# Patient Record
Sex: MALE | Race: WHITE | Employment: FULL TIME | URBAN - METROPOLITAN AREA
[De-identification: names, ages, dates, MRNs, and addresses within clinical notes are randomized per-mention and may not be internally consistent; named-entity substitution may affect disease eponyms.]

---

## 2017-03-08 DIAGNOSIS — I15.0 RENOVASCULAR HYPERTENSION: ICD-10-CM

## 2017-03-08 RX ORDER — AMLODIPINE BESYLATE 5 MG/1
TABLET ORAL
Qty: 30 TAB | Refills: 0 | Status: SHIPPED | OUTPATIENT
Start: 2017-03-08 | End: 2017-04-06 | Stop reason: SDUPTHER

## 2017-04-06 DIAGNOSIS — I15.0 RENOVASCULAR HYPERTENSION: ICD-10-CM

## 2017-04-07 RX ORDER — AMLODIPINE BESYLATE 5 MG/1
TABLET ORAL
Qty: 30 TAB | Refills: 0 | Status: SHIPPED | OUTPATIENT
Start: 2017-04-07 | End: 2017-05-18 | Stop reason: SDUPTHER

## 2017-05-18 ENCOUNTER — OFFICE VISIT (OUTPATIENT)
Dept: FAMILY MEDICINE CLINIC | Age: 52
End: 2017-05-18

## 2017-05-18 VITALS
WEIGHT: 135.2 LBS | HEIGHT: 63 IN | TEMPERATURE: 98 F | HEART RATE: 68 BPM | RESPIRATION RATE: 18 BRPM | DIASTOLIC BLOOD PRESSURE: 83 MMHG | SYSTOLIC BLOOD PRESSURE: 125 MMHG | BODY MASS INDEX: 23.96 KG/M2 | OXYGEN SATURATION: 97 %

## 2017-05-18 DIAGNOSIS — Z12.11 SCREEN FOR COLON CANCER: ICD-10-CM

## 2017-05-18 DIAGNOSIS — I15.0 RENOVASCULAR HYPERTENSION: Primary | ICD-10-CM

## 2017-05-18 RX ORDER — GUAIFENESIN 100 MG/5ML
81 LIQUID (ML) ORAL DAILY
COMMUNITY

## 2017-05-18 RX ORDER — AMLODIPINE BESYLATE 5 MG/1
TABLET ORAL
Qty: 90 TAB | Refills: 3 | Status: SHIPPED | OUTPATIENT
Start: 2017-05-18 | End: 2018-10-31 | Stop reason: SDUPTHER

## 2017-05-18 NOTE — MR AVS SNAPSHOT
Visit Information Date & Time Provider Department Dept. Phone Encounter #  
 5/18/2017  4:00 PM Mohan Abad MD [facility] [phone] Follow-up Instructions Return in about 6 months (around 11/18/2017), or if symptoms worsen or fail to improve, for htn. Your Appointments 7/12/2017  1:45 PM  
PROCEDURE with Mitzy Finley MD  
[facility] Appt Note: 6mo Cysto/Cyto  
 [address] 
[address] [place] [address]  
[address] [facility] [address] Upcoming Health Maintenance Date Due FOBT Q 1 YEAR AGE 50-75 3/10/2017 INFLUENZA AGE 9 TO ADULT 8/1/2017 Pneumococcal 19-64 Highest Risk (2 of 3 - PCV13) 12/6/2017 DTaP/Tdap/Td series (2 - Td) 5/18/2027 Allergies as of 5/18/2017  Review Complete On: 5/18/2017 By: Domenic Rodriguez LPN Severity Noted Reaction Type Reactions Codeine  06/24/2014    Itching Current Immunizations  Never Reviewed No immunizations on file. Not reviewed this visit You Were Diagnosed With   
  
 Codes Comments Renovascular hypertension    -  Primary ICD-10-CM: I15.0 ICD-9-CM: 405.91 Vitals BP Pulse Temp Resp Height(growth percentile) Weight(growth percentile) 125/83 (BP 1 Location: Left arm, BP Patient Position: Sitting) 68 98 °F (36.7 °C) (Oral) 18 5' 3\" (1.6 m) 135 lb 3.2 oz (61.3 kg) SpO2 BMI Smoking Status 97% 23.95 kg/m2 Current Every Day Smoker Vitals History BMI and BSA Data Body Mass Index Body Surface Area  
 23.95 kg/m 2 1.65 m 2 Preferred Pharmacy Pharmacy Name Phone [facility] [address] [place] [address] AT [facility] [phone] Your Updated Medication List  
  
   
This list is accurate as of: 5/18/17  4:13 PM.  Always use your most recent med list.  
  
  
  
  
 amLODIPine 5 mg tablet Commonly known as:  Rekha Hahn TAKE 1 TABLET BY MOUTH DAILY  
  
 aspirin 81 mg chewable tablet Take 81 mg by mouth daily. Prescriptions Sent to Pharmacy Refills  
 amLODIPine (NORVASC) 5 mg tablet 3 Sig: TAKE 1 TABLET BY MOUTH DAILY Class: Normal  
 Pharmacy: [facility] [address] [facility] [address]  #: [phone] Follow-up Instructions Return in about 6 months (around 11/18/2017), or if symptoms worsen or fail to improve, for htn. To-Do List   
 05/18/2017 Lab:  CBC WITH AUTOMATED DIFF   
  
 05/18/2017 Lab:  LIPID PANEL   
  
 05/18/2017 Lab:  METABOLIC PANEL, COMPREHENSIVE Patient Instructions DASH Diet: Care Instructions Your Care Instructions The DASH diet is an eating plan that can help lower your blood pressure. DASH stands for Dietary Approaches to Stop Hypertension. Hypertension is high blood pressure. The DASH diet focuses on eating foods that are high in calcium, potassium, and magnesium. These nutrients can lower blood pressure. The foods that are highest in these nutrients are fruits, vegetables, low-fat dairy products, nuts, seeds, and legumes. But taking calcium, potassium, and magnesium supplements instead of eating foods that are high in those nutrients does not have the same effect. The DASH diet also includes whole grains, fish, and poultry. The DASH diet is one of several lifestyle changes your doctor may recommend to lower your high blood pressure. Your doctor may also want you to decrease the amount of sodium in your diet. Lowering sodium while following the DASH diet can lower blood pressure even further than just the DASH diet alone. Follow-up care is a key part of your treatment and safety.  Be sure to make and go to all appointments, and call your doctor if you are having problems. It's also a good idea to know your test results and keep a list of the medicines you take. How can you care for yourself at home? Following the DASH diet · Eat 4 to 5 servings of fruit each day. A serving is 1 medium-sized piece of fruit, ½ cup chopped or canned fruit, 1/4 cup dried fruit, or 4 ounces (½ cup) of fruit juice. Choose fruit more often than fruit juice. · Eat 4 to 5 servings of vegetables each day. A serving is 1 cup of lettuce or raw leafy vegetables, ½ cup of chopped or cooked vegetables, or 4 ounces (½ cup) of vegetable juice. Choose vegetables more often than vegetable juice. · Get 2 to 3 servings of low-fat and fat-free dairy each day. A serving is 8 ounces of milk, 1 cup of yogurt, or 1 ½ ounces of cheese. · Eat 6 to 8 servings of grains each day. A serving is 1 slice of bread, 1 ounce of dry cereal, or ½ cup of cooked rice, pasta, or cooked cereal. Try to choose whole-grain products as much as possible. · Limit lean meat, poultry, and fish to 2 servings each day. A serving is 3 ounces, about the size of a deck of cards. · Eat 4 to 5 servings of nuts, seeds, and legumes (cooked dried beans, lentils, and split peas) each week. A serving is 1/3 cup of nuts, 2 tablespoons of seeds, or ½ cup of cooked beans or peas. · Limit fats and oils to 2 to 3 servings each day. A serving is 1 teaspoon of vegetable oil or 2 tablespoons of salad dressing. · Limit sweets and added sugars to 5 servings or less a week. A serving is 1 tablespoon jelly or jam, ½ cup sorbet, or 1 cup of lemonade. · Eat less than 2,300 milligrams (mg) of sodium a day. If you limit your sodium to 1,500 mg a day, you can lower your blood pressure even more. Tips for success · Start small. Do not try to make dramatic changes to your diet all at once. You might feel that you are missing out on your favorite foods and then be more likely to not follow the plan.  Make small changes, and stick with them. Once those changes become habit, add a few more changes. · Try some of the following: ¨ Make it a goal to eat a fruit or vegetable at every meal and at snacks. This will make it easy to get the recommended amount of fruits and vegetables each day. ¨ Try yogurt topped with fruit and nuts for a snack or healthy dessert. ¨ Add lettuce, tomato, cucumber, and onion to sandwiches. ¨ Combine a ready-made pizza crust with low-fat mozzarella cheese and lots of vegetable toppings. Try using tomatoes, squash, spinach, broccoli, carrots, cauliflower, and onions. ¨ Have a variety of cut-up vegetables with a low-fat dip as an appetizer instead of chips and dip. ¨ Sprinkle sunflower seeds or chopped almonds over salads. Or try adding chopped walnuts or almonds to cooked vegetables. ¨ Try some vegetarian meals using beans and peas. Add garbanzo or kidney beans to salads. Make burritos and tacos with mashed salinas beans or black beans. Where can you learn more? Go to http://staciSmartStudy.commar.info/. Enter T381 in the search box to learn more about \"DASH Diet: Care Instructions. \" Current as of: March 23, 2016 Content Version: 11.2 © 8707-8593 [employer]ocratic. Care instructions adapted under license by [employer] (which disclaims liability or warranty for this information). If you have questions about a medical condition or this instruction, always ask your healthcare professional. [place] [address] any warranty or liability for your use of this information. High Blood Pressure: Care Instructions Your Care Instructions If your blood pressure is usually above 140/90, you have high blood pressure, or hypertension. That means the top number is 140 or higher or the bottom number is 90 or higher, or both. Despite what a lot of people think, high blood pressure usually doesn't cause headaches or make you feel dizzy or lightheaded.  It usually has no symptoms. But it does increase your risk for heart attack, stroke, and kidney or eye damage. The higher your blood pressure, the more your risk increases. Your doctor will give you a goal for your blood pressure. Your goal will be based on your health and your age. An example of a goal is to keep your blood pressure below 140/90. Lifestyle changes, such as eating healthy and being active, are always important to help lower blood pressure. You might also take medicine to reach your blood pressure goal. 
Follow-up care is a key part of your treatment and safety. Be sure to make and go to all appointments, and call your doctor if you are having problems. It's also a good idea to know your test results and keep a list of the medicines you take. How can you care for yourself at home? Medical treatment · If you stop taking your medicine, your blood pressure will go back up. You may take one or more types of medicine to lower your blood pressure. Be safe with medicines. Take your medicine exactly as prescribed. Call your doctor if you think you are having a problem with your medicine. · Talk to your doctor before you start taking aspirin every day. Aspirin can help certain people lower their risk of a heart attack or stroke. But taking aspirin isn't right for everyone, because it can cause serious bleeding. · See your doctor regularly. You may need to see the doctor more often at first or until your blood pressure comes down. · If you are taking blood pressure medicine, talk to your doctor before you take decongestants or anti-inflammatory medicine, such as ibuprofen. Some of these medicines can raise blood pressure. · Learn how to check your blood pressure at home. Lifestyle changes · Stay at a healthy weight. This is especially important if you put on weight around the waist. Losing even 10 pounds can help you lower your blood pressure. · If your doctor recommends it, get more exercise.  Walking is a good choice. Bit by bit, increase the amount you walk every day. Try for at least 30 minutes on most days of the week. You also may want to swim, bike, or do other activities. · Avoid or limit alcohol. Talk to your doctor about whether you can drink any alcohol. · Try to limit how much sodium you eat to less than 2,300 milligrams (mg) a day. Your doctor may ask you to try to eat less than 1,500 mg a day. · Eat plenty of fruits (such as bananas and oranges), vegetables, legumes, whole grains, and low-fat dairy products. · Lower the amount of saturated fat in your diet. Saturated fat is found in animal products such as milk, cheese, and meat. Limiting these foods may help you lose weight and also lower your risk for heart disease. · Do not smoke. Smoking increases your risk for heart attack and stroke. If you need help quitting, talk to your doctor about stop-smoking programs and medicines. These can increase your chances of quitting for good. When should you call for help? Call 911 anytime you think you may need emergency care. This may mean having symptoms that suggest that your blood pressure is causing a serious heart or blood vessel problem. Your blood pressure may be over 180/110. For example, call 911 if: 
· You have symptoms of a heart attack. These may include: ¨ Chest pain or pressure, or a strange feeling in the chest. 
¨ Sweating. ¨ Shortness of breath. ¨ Nausea or vomiting. ¨ Pain, pressure, or a strange feeling in the back, neck, jaw, or upper belly or in one or both shoulders or arms. ¨ Lightheadedness or sudden weakness. ¨ A fast or irregular heartbeat. · You have symptoms of a stroke. These may include: 
¨ Sudden numbness, tingling, weakness, or loss of movement in your face, arm, or leg, especially on only one side of your body. ¨ Sudden vision changes. ¨ Sudden trouble speaking. ¨ Sudden confusion or trouble understanding simple statements. ¨ Sudden problems with walking or balance. ¨ A sudden, severe headache that is different from past headaches. · You have severe back or belly pain. Do not wait until your blood pressure comes down on its own. Get help right away. Call your doctor now or seek immediate care if: 
· Your blood pressure is much higher than normal (such as 180/110 or higher), but you don't have symptoms. · You think high blood pressure is causing symptoms, such as: ¨ Severe headache. ¨ Blurry vision. Watch closely for changes in your health, and be sure to contact your doctor if: 
· Your blood pressure measures 140/90 or higher at least 2 times. That means the top number is 140 or higher or the bottom number is 90 or higher, or both. · You think you may be having side effects from your blood pressure medicine. · Your blood pressure is usually normal, but it goes above normal at least 2 times. Where can you learn more? Go to http://staci-mar.info/. Enter G450 in the search box to learn more about \"High Blood Pressure: Care Instructions. \" Current as of: August 8, 2016 Content Version: 11.2 © 3727-3249 [employer]. Care instructions adapted under license by [employer] (which disclaims liability or warranty for this information). If you have questions about a medical condition or this instruction, always ask your healthcare professional. [address] any warranty or liability for your use of this information. Introducing [facility]! Genie Blanco introduces Pango patient portal. Now you can access parts of your medical record, email your doctor's office, and request medication refills online. 1. In your internet browser, go to [email or web address]://[email or web address]. [employer] 2. Click on the First Time User? Click Here link in the Sign In box. You will see the New Member Sign Up page. 3. Enter your Pango Access Code exactly as it appears below.  You will not need to use this code after youve completed the sign-up process. If you do not sign up before the expiration date, you must request a new code. · Revolve. Access Code: [email or web address] Expires: 8/16/2017  4:12 PM 
 
4. Enter the last four digits of your Social Security Number (xxxx) and Date of Birth (mm/dd/yyyy) as indicated and click Submit. You will be taken to the next sign-up page. 5. Create a Revolve. ID. This will be your Revolve. login ID and cannot be changed, so think of one that is secure and easy to remember. 6. Create a Revolve. password. You can change your password at any time. 7. Enter your Password Reset Question and Answer. This can be used at a later time if you forget your password. 8. Enter your e-mail address. You will receive e-mail notification when new information is available in [address]. 9. Click Sign Up. You can now view and download portions of your medical record. 10. Click the Download Summary menu link to download a portable copy of your medical information. If you have questions, please visit the Frequently Asked Questions section of the Revolve. website. Remember, Revolve. is NOT to be used for urgent needs. For medical emergencies, dial 911. Now available from your iPhone and Android! Please provide this summary of care documentation to your next provider. Your primary care clinician is listed as Mohan Saravia. If you have any questions after today's visit, please call [phone].

## 2017-05-18 NOTE — PATIENT INSTRUCTIONS
DASH Diet: Care Instructions  Your Care Instructions  The DASH diet is an eating plan that can help lower your blood pressure. DASH stands for Dietary Approaches to Stop Hypertension. Hypertension is high blood pressure. The DASH diet focuses on eating foods that are high in calcium, potassium, and magnesium. These nutrients can lower blood pressure. The foods that are highest in these nutrients are fruits, vegetables, low-fat dairy products, nuts, seeds, and legumes. But taking calcium, potassium, and magnesium supplements instead of eating foods that are high in those nutrients does not have the same effect. The DASH diet also includes whole grains, fish, and poultry. The DASH diet is one of several lifestyle changes your doctor may recommend to lower your high blood pressure. Your doctor may also want you to decrease the amount of sodium in your diet. Lowering sodium while following the DASH diet can lower blood pressure even further than just the DASH diet alone. Follow-up care is a key part of your treatment and safety. Be sure to make and go to all appointments, and call your doctor if you are having problems. It's also a good idea to know your test results and keep a list of the medicines you take. How can you care for yourself at home? Following the DASH diet  · Eat 4 to 5 servings of fruit each day. A serving is 1 medium-sized piece of fruit, ½ cup chopped or canned fruit, 1/4 cup dried fruit, or 4 ounces (½ cup) of fruit juice. Choose fruit more often than fruit juice. · Eat 4 to 5 servings of vegetables each day. A serving is 1 cup of lettuce or raw leafy vegetables, ½ cup of chopped or cooked vegetables, or 4 ounces (½ cup) of vegetable juice. Choose vegetables more often than vegetable juice. · Get 2 to 3 servings of low-fat and fat-free dairy each day. A serving is 8 ounces of milk, 1 cup of yogurt, or 1 ½ ounces of cheese. · Eat 6 to 8 servings of grains each day.  A serving is 1 slice of bread, 1 ounce of dry cereal, or ½ cup of cooked rice, pasta, or cooked cereal. Try to choose whole-grain products as much as possible. · Limit lean meat, poultry, and fish to 2 servings each day. A serving is 3 ounces, about the size of a deck of cards. · Eat 4 to 5 servings of nuts, seeds, and legumes (cooked dried beans, lentils, and split peas) each week. A serving is 1/3 cup of nuts, 2 tablespoons of seeds, or ½ cup of cooked beans or peas. · Limit fats and oils to 2 to 3 servings each day. A serving is 1 teaspoon of vegetable oil or 2 tablespoons of salad dressing. · Limit sweets and added sugars to 5 servings or less a week. A serving is 1 tablespoon jelly or jam, ½ cup sorbet, or 1 cup of lemonade. · Eat less than 2,300 milligrams (mg) of sodium a day. If you limit your sodium to 1,500 mg a day, you can lower your blood pressure even more. Tips for success  · Start small. Do not try to make dramatic changes to your diet all at once. You might feel that you are missing out on your favorite foods and then be more likely to not follow the plan. Make small changes, and stick with them. Once those changes become habit, add a few more changes. · Try some of the following:  ¨ Make it a goal to eat a fruit or vegetable at every meal and at snacks. This will make it easy to get the recommended amount of fruits and vegetables each day. ¨ Try yogurt topped with fruit and nuts for a snack or healthy dessert. ¨ Add lettuce, tomato, cucumber, and onion to sandwiches. ¨ Combine a ready-made pizza crust with low-fat mozzarella cheese and lots of vegetable toppings. Try using tomatoes, squash, spinach, broccoli, carrots, cauliflower, and onions. ¨ Have a variety of cut-up vegetables with a low-fat dip as an appetizer instead of chips and dip. ¨ Sprinkle sunflower seeds or chopped almonds over salads. Or try adding chopped walnuts or almonds to cooked vegetables. ¨ Try some vegetarian meals using beans and peas. Add garbanzo or kidney beans to salads. Make burritos and tacos with mashed [place] beans or black beans. Where can you learn more? Go to http://staci-mar.info/. Enter E513 in the search box to learn more about \"DASH Diet: Care Instructions. \"  Current as of: March 23, 2016  Content Version: 11.2  © 4282-8861 [employer]. Care instructions adapted under license by [employer] (which disclaims liability or warranty for this information). If you have questions about a medical condition or this instruction, always ask your healthcare professional. [place] [address] any warranty or liability for your use of this information. High Blood Pressure: Care Instructions  Your Care Instructions  If your blood pressure is usually above 140/90, you have high blood pressure, or hypertension. That means the top number is 140 or higher or the bottom number is 90 or higher, or both. Despite what a lot of people think, high blood pressure usually doesn't cause headaches or make you feel dizzy or lightheaded. It usually has no symptoms. But it does increase your risk for heart attack, stroke, and kidney or eye damage. The higher your blood pressure, the more your risk increases. Your doctor will give you a goal for your blood pressure. Your goal will be based on your health and your age. An example of a goal is to keep your blood pressure below 140/90. Lifestyle changes, such as eating healthy and being active, are always important to help lower blood pressure. You might also take medicine to reach your blood pressure goal.  Follow-up care is a key part of your treatment and safety. Be sure to make and go to all appointments, and call your doctor if you are having problems. It's also a good idea to know your test results and keep a list of the medicines you take. How can you care for yourself at home?   Medical treatment  · If you stop taking your medicine, your blood pressure will go back up. You may take one or more types of medicine to lower your blood pressure. Be safe with medicines. Take your medicine exactly as prescribed. Call your doctor if you think you are having a problem with your medicine. · Talk to your doctor before you start taking aspirin every day. Aspirin can help certain people lower their risk of a heart attack or stroke. But taking aspirin isn't right for everyone, because it can cause serious bleeding. · See your doctor regularly. You may need to see the doctor more often at first or until your blood pressure comes down. · If you are taking blood pressure medicine, talk to your doctor before you take decongestants or anti-inflammatory medicine, such as ibuprofen. Some of these medicines can raise blood pressure. · Learn how to check your blood pressure at home. Lifestyle changes  · Stay at a healthy weight. This is especially important if you put on weight around the waist. Losing even 10 pounds can help you lower your blood pressure. · If your doctor recommends it, get more exercise. Walking is a good choice. Bit by bit, increase the amount you walk every day. Try for at least 30 minutes on most days of the week. You also may want to swim, bike, or do other activities. · Avoid or limit alcohol. Talk to your doctor about whether you can drink any alcohol. · Try to limit how much sodium you eat to less than 2,300 milligrams (mg) a day. Your doctor may ask you to try to eat less than 1,500 mg a day. · Eat plenty of fruits (such as bananas and oranges), vegetables, legumes, whole grains, and low-fat dairy products. · Lower the amount of saturated fat in your diet. Saturated fat is found in animal products such as milk, cheese, and meat. Limiting these foods may help you lose weight and also lower your risk for heart disease. · Do not smoke. Smoking increases your risk for heart attack and stroke.  If you need help quitting, talk to your doctor about stop-smoking programs and medicines. These can increase your chances of quitting for good. When should you call for help? Call 911 anytime you think you may need emergency care. This may mean having symptoms that suggest that your blood pressure is causing a serious heart or blood vessel problem. Your blood pressure may be over 180/110. For example, call 911 if:  · You have symptoms of a heart attack. These may include:  ¨ Chest pain or pressure, or a strange feeling in the chest.  ¨ Sweating. ¨ Shortness of breath. ¨ Nausea or vomiting. ¨ Pain, pressure, or a strange feeling in the back, neck, jaw, or upper belly or in one or both shoulders or arms. ¨ Lightheadedness or sudden weakness. ¨ A fast or irregular heartbeat. · You have symptoms of a stroke. These may include:  ¨ Sudden numbness, tingling, weakness, or loss of movement in your face, arm, or leg, especially on only one side of your body. ¨ Sudden vision changes. ¨ Sudden trouble speaking. ¨ Sudden confusion or trouble understanding simple statements. ¨ Sudden problems with walking or balance. ¨ A sudden, severe headache that is different from past headaches. · You have severe back or belly pain. Do not wait until your blood pressure comes down on its own. Get help right away. Call your doctor now or seek immediate care if:  · Your blood pressure is much higher than normal (such as 180/110 or higher), but you don't have symptoms. · You think high blood pressure is causing symptoms, such as:  ¨ Severe headache. ¨ Blurry vision. Watch closely for changes in your health, and be sure to contact your doctor if:  · Your blood pressure measures 140/90 or higher at least 2 times. That means the top number is 140 or higher or the bottom number is 90 or higher, or both. · You think you may be having side effects from your blood pressure medicine. · Your blood pressure is usually normal, but it goes above normal at least 2 times.   Where can you learn more? Go to http://staci-mar.info/. Enter X689 in the search box to learn more about \"High Blood Pressure: Care Instructions. \"  Current as of: August 8, 2016  Content Version: 11.2  © 6189-2585 [employer]. Care instructions adapted under license by [employer] (which disclaims liability or warranty for this information). If you have questions about a medical condition or this instruction, always ask your healthcare professional. [place] [address] any warranty or liability for your use of this information.
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
## 2017-05-19 LAB
A-G RATIO,AGRAT: 2 RATIO (ref 1.1–2.6)
ABSOLUTE LYMPHOCYTE COUNT, 10803: 3.2 K/UL (ref 1–4.8)
ALBUMIN SERPL-MCNC: 4.5 G/DL (ref 3.5–5)
ALP SERPL-CCNC: 45 U/L (ref 25–115)
ALT SERPL-CCNC: 13 U/L (ref 5–40)
ANION GAP SERPL CALC-SCNC: 15 MMOL/L
AST SERPL W P-5'-P-CCNC: 12 U/L (ref 10–37)
BASOPHILS # BLD: 0 K/UL (ref 0–0.2)
BASOPHILS NFR BLD: 0 % (ref 0–2)
BILIRUB SERPL-MCNC: 0.3 MG/DL (ref 0.2–1.2)
BUN SERPL-MCNC: 13 MG/DL (ref 6–22)
CALCIUM SERPL-MCNC: 9.5 MG/DL (ref 8.4–10.4)
CHLORIDE SERPL-SCNC: 108 MMOL/L (ref 98–110)
CHOLEST SERPL-MCNC: 183 MG/DL (ref 110–200)
CO2 SERPL-SCNC: 22 MMOL/L (ref 20–32)
CREAT SERPL-MCNC: 0.9 MG/DL (ref 0.5–1.2)
EOSINOPHIL # BLD: 0.6 K/UL (ref 0–0.5)
EOSINOPHIL NFR BLD: 6 % (ref 0–6)
ERYTHROCYTE [DISTWIDTH] IN BLOOD BY AUTOMATED COUNT: 14.9 % (ref 10–16)
GFRAA, 66117: >60
GFRNA, 66118: >60
GLOBULIN,GLOB: 2.3 G/DL (ref 2–4)
GLUCOSE SERPL-MCNC: 86 MG/DL (ref 65–99)
GRANULOCYTES,GRANS: 55 % (ref 40–75)
HCT VFR BLD AUTO: 47.3 % (ref 39.3–51.6)
HDLC SERPL-MCNC: 68 MG/DL (ref 40–59)
HGB BLD-MCNC: 15.4 G/DL (ref 13.1–17.2)
LDLC SERPL CALC-MCNC: 99 MG/DL (ref 50–99)
LYMPHOCYTES, LYMLT: 33 % (ref 27–45)
MCH RBC QN AUTO: 33 PG (ref 26–34)
MCHC RBC AUTO-ENTMCNC: 33 G/DL (ref 32–36)
MCV RBC AUTO: 100 FL (ref 80–95)
MONOCYTES # BLD: 0.5 K/UL (ref 0.1–0.9)
MONOCYTES NFR BLD: 5 % (ref 3–9)
NEUTROPHILS # BLD AUTO: 5.2 K/UL (ref 1.8–7.7)
PLATELET # BLD AUTO: 208 K/UL (ref 140–440)
PMV BLD AUTO: 11.9 FL (ref 6–10.8)
POTASSIUM SERPL-SCNC: 4.2 MMOL/L (ref 3.5–5.5)
PROT SERPL-MCNC: 6.8 G/DL (ref 6.4–8.3)
RBC # BLD AUTO: 4.73 M/UL (ref 3.8–5.8)
SODIUM SERPL-SCNC: 145 MMOL/L (ref 133–145)
TRIGL SERPL-MCNC: 81 MG/DL (ref 40–149)
VLDLC SERPL CALC-MCNC: 16 MG/DL (ref 8–30)
WBC # BLD AUTO: 9.5 K/UL (ref 4–11)

## 2017-05-25 NOTE — PROGRESS NOTES
Informed patients wife, Fransisca Jennings, all lab results are stable. Wife did verbalize understanding.

## 2017-11-16 ENCOUNTER — OFFICE VISIT (OUTPATIENT)
Dept: FAMILY MEDICINE CLINIC | Age: 52
End: 2017-11-16

## 2017-11-16 VITALS
HEIGHT: 63 IN | OXYGEN SATURATION: 96 % | TEMPERATURE: 97.2 F | RESPIRATION RATE: 18 BRPM | BODY MASS INDEX: 24.63 KG/M2 | WEIGHT: 139 LBS | HEART RATE: 69 BPM | SYSTOLIC BLOOD PRESSURE: 121 MMHG | DIASTOLIC BLOOD PRESSURE: 86 MMHG

## 2017-11-16 DIAGNOSIS — I15.0 RENOVASCULAR HYPERTENSION: Primary | ICD-10-CM

## 2017-11-16 DIAGNOSIS — Z12.11 SCREEN FOR COLON CANCER: ICD-10-CM

## 2017-11-16 DIAGNOSIS — M79.7 FIBROMYALGIA: ICD-10-CM

## 2017-11-16 DIAGNOSIS — M25.50 ARTHRALGIA, UNSPECIFIED JOINT: ICD-10-CM

## 2017-11-16 RX ORDER — NAPROXEN 500 MG/1
500 TABLET ORAL
Qty: 60 TAB | Refills: 0 | Status: SHIPPED | OUTPATIENT
Start: 2017-11-16 | End: 2019-02-19

## 2017-11-16 NOTE — PATIENT INSTRUCTIONS
Musculoskeletal Pain: Care Instructions  Your Care Instructions    Different problems with the bones, muscles, nerves, ligaments, and tendons in the body can cause pain. One or more areas of your body may ache or burn. Or they may feel tired, stiff, or sore. The medical term for this type of pain is musculoskeletal pain. It can have many different causes. Sometimes the pain is caused by an injury such as a strain or sprain. Or you might have pain from using one part of your body in the same way over and over again. This is called overuse. In some cases, the cause of the pain is another health problem such as arthritis or fibromyalgia. The doctor will examine you and ask you questions about your health to help find the cause of your pain. Blood tests or imaging tests like an X-ray may also be helpful. But sometimes doctors can't find a cause of the pain. Treatment depends on your symptoms and the cause of the pain, if known. The doctor has checked you carefully, but problems can develop later. If you notice any problems or new symptoms, get medical treatment right away. Follow-up care is a key part of your treatment and safety. Be sure to make and go to all appointments, and call your doctor if you are having problems. It's also a good idea to know your test results and keep a list of the medicines you take. How can you care for yourself at home? · Rest until you feel better. · Do not do anything that makes the pain worse. Return to exercise gradually if you feel better and your doctor says it's okay. · Be safe with medicines. Read and follow all instructions on the label. ¨ If the doctor gave you a prescription medicine for pain, take it as prescribed. ¨ If you are not taking a prescription pain medicine, ask your doctor if you can take an over-the-counter medicine. · Put ice or a cold pack on the area for 10 to 20 minutes at a time to ease pain.  Put a thin cloth between the ice and your skin.  When should you call for help? Call your doctor now or seek immediate medical care if:  ? · You have new pain, or your pain gets worse. ? · You have new symptoms such as a fever, a rash, or chills. ? Watch closely for changes in your health, and be sure to contact your doctor if:  ? · You do not get better as expected. Where can you learn more? Go to http://staci-mar.info/. Enter V652 in the search box to learn more about \"Musculoskeletal Pain: Care Instructions. \"  Current as of: October 14, 2016  Content Version: 11.4  © 6379-3349 [employer]. Care instructions adapted under license by [employer] (which disclaims liability or warranty for this information). If you have questions about a medical condition or this instruction, always ask your healthcare professional. [address] any warranty or liability for your use of this information.

## 2017-11-16 NOTE — MR AVS SNAPSHOT
Visit Information Date & Time Provider Department Dept. Phone Encounter #  
 11/16/2017  4:00 PM Mohan Mixon MD [facility] [phone] [id number] Follow-up Instructions Return in about 6 months (around 5/16/2018), or if symptoms worsen or fail to improve, for htn. Upcoming Health Maintenance Date Due FOBT Q 1 YEAR AGE 50-75 3/10/2017 Pneumococcal 19-64 Highest Risk (2 of 3 - PCV13) 12/6/2017 DTaP/Tdap/Td series (2 - Td) 5/18/2027 Allergies as of 11/16/2017  Review Complete On: 11/16/2017 By: Janey Hathaway MD  
  
 Severity Noted Reaction Type Reactions Codeine  06/24/2014    Itching Current Immunizations  Never Reviewed No immunizations on file. Not reviewed this visit You Were Diagnosed With   
  
 Codes Comments Renovascular hypertension    -  Primary ICD-10-CM: I15.0 ICD-9-CM: 405.91 Screen for colon cancer     ICD-10-CM: Z12.11 ICD-9-CM: V76.51 Arthralgia, unspecified joint     ICD-10-CM: M25.50 ICD-9-CM: 719.40 Fibromyalgia     ICD-10-CM: M79.7 ICD-9-CM: 729.1 Vitals BP Pulse Temp Resp Height(growth percentile) Weight(growth percentile) 121/86 (BP 1 Location: Left arm, BP Patient Position: Sitting) 69 97.2 °F (36.2 °C) (Oral) 18 5' 3\" (1.6 m) 139 lb (63 kg) SpO2 BMI Smoking Status 96% 24.62 kg/m2 Current Every Day Smoker BMI and BSA Data Body Mass Index Body Surface Area  
 24.62 kg/m 2 1.67 m 2 Preferred Pharmacy Pharmacy Name Phone [facility] [address] [place] [address] AT [facility] [phone] Your Updated Medication List  
  
   
This list is accurate as of: 11/16/17  4:21 PM.  Always use your most recent med list. amLODIPine 5 mg tablet Commonly known as:  Mis Grajeda TAKE 1 TABLET BY MOUTH DAILY  
  
 aspirin 81 mg chewable tablet Take 81 mg by mouth daily. naproxen 500 mg tablet Commonly known as:  NAPROSYN Take 1 Tab by mouth two (2) times daily as needed. Prescriptions Sent to Pharmacy Refills  
 naproxen (NAPROSYN) 500 mg tablet 0 Sig: Take 1 Tab by mouth two (2) times daily as needed. Class: Normal  
 Pharmacy: [facility] [address] [place] [address] [address]  #: [phone] Route: Oral  
  
Follow-up Instructions Return in about 6 months (around 5/16/2018), or if symptoms worsen or fail to improve, for htn. To-Do List   
 12/16/2017 Lab:  OCCULT BLOOD, IMMUNOASSAY (FIT) Patient Instructions Musculoskeletal Pain: Care Instructions Your Care Instructions Different problems with the bones, muscles, nerves, ligaments, and tendons in the body can cause pain. One or more areas of your body may ache or burn. Or they may feel tired, stiff, or sore. The medical term for this type of pain is musculoskeletal pain. It can have many different causes. Sometimes the pain is caused by an injury such as a strain or sprain. Or you might have pain from using one part of your body in the same way over and over again. This is called overuse. In some cases, the cause of the pain is another health problem such as arthritis or fibromyalgia. The doctor will examine you and ask you questions about your health to help find the cause of your pain. Blood tests or imaging tests like an X-ray may also be helpful. But sometimes doctors can't find a cause of the pain. Treatment depends on your symptoms and the cause of the pain, if known. The doctor has checked you carefully, but problems can develop later. If you notice any problems or new symptoms, get medical treatment right away. Follow-up care is a key part of your treatment and safety.  Be sure to make and go to all appointments, and call your doctor if you are having problems. It's also a good idea to know your test results and keep a list of the medicines you take. How can you care for yourself at home? · Rest until you feel better. · Do not do anything that makes the pain worse. Return to exercise gradually if you feel better and your doctor says it's okay. · Be safe with medicines. Read and follow all instructions on the label. ¨ If the doctor gave you a prescription medicine for pain, take it as prescribed. ¨ If you are not taking a prescription pain medicine, ask your doctor if you can take an over-the-counter medicine. · Put ice or a cold pack on the area for 10 to 20 minutes at a time to ease pain. Put a thin cloth between the ice and your skin. When should you call for help? Call your doctor now or seek immediate medical care if: 
? · You have new pain, or your pain gets worse. ? · You have new symptoms such as a fever, a rash, or chills. ? Watch closely for changes in your health, and be sure to contact your doctor if: 
? · You do not get better as expected. Where can you learn more? Go to http://staci-mar.info/. Enter M532 in the search box to learn more about \"Musculoskeletal Pain: Care Instructions. \" Current as of: October 14, 2016 Content Version: 11.4 © 6908-4081 [employer]. Care instructions adapted under license by [employer] (which disclaims liability or warranty for this information). If you have questions about a medical condition or this instruction, always ask your healthcare professional. [place] [address] any warranty or liability for your use of this information. Introducing [facility]! Issac Sharma introduces Premonix patient portal. Now you can access parts of your medical record, email your doctor's office, and request medication refills online. 1. In your internet browser, go to [email or web address] 2. Click on the First Time User? Click Here link in the Sign In box. You will see the New Member Sign Up page. 3. Enter your CloudSway Access Code exactly as it appears below. You will not need to use this code after youve completed the sign-up process. If you do not sign up before the expiration date, you must request a new code. · CloudSway Access Code: [email or web address] Expires: 2/14/2018  4:21 PM 
 
4. Enter the last four digits of your Social Security Number (xxxx) and Date of Birth (mm/dd/yyyy) as indicated and click Submit. You will be taken to the next sign-up page. 5. Create a CloudSway ID. This will be your CloudSway login ID and cannot be changed, so think of one that is secure and easy to remember. 6. Create a CloudSway password. You can change your password at any time. 7. Enter your Password Reset Question and Answer. This can be used at a later time if you forget your password. 8. Enter your e-mail address. You will receive e-mail notification when new information is available in [address] 9. Click Sign Up. You can now view and download portions of your medical record. 10. Click the Download Summary menu link to download a portable copy of your medical information. If you have questions, please visit the Frequently Asked Questions section of the CloudSway website. Remember, CloudSway is NOT to be used for urgent needs. For medical emergencies, dial 911. Now available from your iPhone and Android! Please provide this summary of care documentation to your next provider. Your primary care clinician is listed as Mohan Saravia. If you have any questions after today's visit, please call [phone].
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
## 2017-11-21 LAB — HEMOCCULT STL QL IA: NEGATIVE

## 2017-12-16 DIAGNOSIS — Z12.11 SCREEN FOR COLON CANCER: ICD-10-CM

## 2018-02-07 ENCOUNTER — OFFICE VISIT (OUTPATIENT)
Dept: FAMILY MEDICINE CLINIC | Age: 53
End: 2018-02-07

## 2018-02-07 VITALS
SYSTOLIC BLOOD PRESSURE: 137 MMHG | BODY MASS INDEX: 24.45 KG/M2 | OXYGEN SATURATION: 97 % | WEIGHT: 138 LBS | HEART RATE: 87 BPM | RESPIRATION RATE: 18 BRPM | DIASTOLIC BLOOD PRESSURE: 83 MMHG | HEIGHT: 63 IN | TEMPERATURE: 98.3 F

## 2018-02-07 DIAGNOSIS — R68.89 FLU-LIKE SYMPTOMS: Primary | ICD-10-CM

## 2018-02-07 DIAGNOSIS — J00 ACUTE RHINITIS, UNSPECIFIED TYPE: ICD-10-CM

## 2018-02-07 RX ORDER — OSELTAMIVIR PHOSPHATE 75 MG/1
75 CAPSULE ORAL 2 TIMES DAILY
Qty: 10 CAP | Refills: 0 | Status: SHIPPED | OUTPATIENT
Start: 2018-02-07 | End: 2018-02-12

## 2018-02-07 RX ORDER — GUAIFENESIN/DEXTROMETHORPHAN 100-10MG/5
5 SYRUP ORAL
Qty: 236 ML | Refills: 0 | Status: SHIPPED | OUTPATIENT
Start: 2018-02-07 | End: 2018-02-17

## 2018-02-07 NOTE — LETTER
NOTIFICATION RETURN TO WORK 
 
2/7/2018 11:16 AM 
 
Mr. Geri Espinoza [address] [facility] [place] [address] To Whom It May Concern: 
 
Geri Espinoza is currently under the care of [address] [place] [address]. He will return to work on: 02/09/2018 If there are questions or concerns please have the patient contact our office. Sincerely, Guerita Bermeo MD

## 2018-02-07 NOTE — MR AVS SNAPSHOT
[facility] [address] [facility] 
[phone] Patient: Chris Fontanez MRN: [id number] :1965 Visit Information Date & Time Provider Department Dept. Phone Encounter #  
 2018 10:45 AM Mohan Brizuela MD [facility] [phone] [id number] Follow-up Instructions Return if symptoms worsen or fail to improve. Your Appointments 2018  4:00 PM  
Follow Up with Mohan Brizuela MD  
[facility] Appt Note: htn  
 [address] [facility] [address] [address] [address]  
  
   
 [address] Upcoming Health Maintenance Date Due Pneumococcal 19-64 Highest Risk (2 of 3 - PCV13) 2017 FOBT Q 1 YEAR AGE 50-75 2018 DTaP/Tdap/Td series (2 - Td) 2027 Allergies as of 2018  Review Complete On: 2017 By: Michael Holm MD  
  
 Severity Noted Reaction Type Reactions Codeine  2014    Itching Current Immunizations  Never Reviewed No immunizations on file. Not reviewed this visit You Were Diagnosed With   
  
 Codes Comments Flu-like symptoms    -  Primary ICD-10-CM: R68.89 ICD-9-CM: 780.99 Acute rhinitis, unspecified type     ICD-10-CM: Jeanette Hinojosa ICD-9-CM: 749 Vitals BP Pulse Temp Resp Height(growth percentile) Weight(growth percentile) 137/83 (BP 1 Location: Left arm, BP Patient Position: Sitting) 87 98.3 °F (36.8 °C) (Oral) 18 5' 3\" (1.6 m) 138 lb (62.6 kg) SpO2 BMI Smoking Status 97% 24.45 kg/m2 Current Every Day Smoker BMI and BSA Data Body Mass Index Body Surface Area  
 24.45 kg/m 2 1.67 m 2 Preferred Pharmacy Pharmacy Name Phone [facility] [address] [place] [address] AT [facility] [phone] Your Updated Medication List  
  
   
This list is accurate as of: 2/7/18 11:16 AM.  Always use your most recent med list. amLODIPine 5 mg tablet Commonly known as:  Lavonne Gant TAKE 1 TABLET BY MOUTH DAILY  
  
 aspirin 81 mg chewable tablet Take 81 mg by mouth daily. guaiFENesin-dextromethorphan 100-10 mg/5 mL syrup Commonly known as:  ROBITUSSIN DM Take 5 mL by mouth three (3) times daily as needed for Cough for up to 10 days. Indications: COLD SYMPTOMS  
  
 naproxen 500 mg tablet Commonly known as:  NAPROSYN Take 1 Tab by mouth two (2) times daily as needed. oseltamivir 75 mg capsule Commonly known as:  TAMIFLU Take 1 Cap by mouth two (2) times a day for 5 days. Prescriptions Sent to Pharmacy Refills  
 oseltamivir (TAMIFLU) 75 mg capsule 0 Sig: Take 1 Cap by mouth two (2) times a day for 5 days. Class: Normal  
 Pharmacy: [facility] [address] [place] [address] [facility] [phone] Route: Oral  
 guaiFENesin-dextromethorphan (ROBITUSSIN DM) 100-10 mg/5 mL syrup 0 Sig: Take 5 mL by mouth three (3) times daily as needed for Cough for up to 10 days. Indications: COLD SYMPTOMS Class: Normal  
 Pharmacy: [facility] [address] [place] [address] [facility] Ph #: [phone] Route: Oral  
  
Follow-up Instructions Return if symptoms worsen or fail to improve. Introducing [address]! [facility] introduces MeetLinkshare patient portal. Now you can access parts of your medical record, email your doctor's office, and request medication refills online. 1. In your internet browser, go to [email or web address] 2. Click on the First Time User? Click Here link in the Sign In box. You will see the New Member Sign Up page. 3. Enter your MeetLinkshare Access Code exactly as it appears below.  You will not need to use this code after youve completed the sign-up process. If you do not sign up before the expiration date, you must request a new code. · Metropolist Access Code: [email or web address] Expires: 2/14/2018  4:21 PM 
 
4. Enter the last four digits of your Social Security Number (xxxx) and Date of Birth (mm/dd/yyyy) as indicated and click Submit. You will be taken to the next sign-up page. 5. Create a Metropolist ID. This will be your Metropolist login ID and cannot be changed, so think of one that is secure and easy to remember. 6. Create a Metropolist password. You can change your password at any time. 7. Enter your Password Reset Question and Answer. This can be used at a later time if you forget your password. 8. Enter your e-mail address. You will receive e-mail notification when new information is available in [address] 9. Click Sign Up. You can now view and download portions of your medical record. 10. Click the Download Summary menu link to download a portable copy of your medical information. If you have questions, please visit the Frequently Asked Questions section of the Metropolist website. Remember, Metropolist is NOT to be used for urgent needs. For medical emergencies, dial 911. Now available from your iPhone and Android! Please provide this summary of care documentation to your next provider. Your primary care clinician is listed as Mohan Saravia. If you have any questions after today's visit, please call [phone].
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
## 2018-05-16 ENCOUNTER — OFFICE VISIT (OUTPATIENT)
Dept: FAMILY MEDICINE CLINIC | Age: 53
End: 2018-05-16

## 2018-05-16 VITALS
OXYGEN SATURATION: 96 % | HEIGHT: 63 IN | DIASTOLIC BLOOD PRESSURE: 76 MMHG | WEIGHT: 135 LBS | TEMPERATURE: 97.9 F | BODY MASS INDEX: 23.92 KG/M2 | SYSTOLIC BLOOD PRESSURE: 116 MMHG | RESPIRATION RATE: 18 BRPM | HEART RATE: 72 BPM

## 2018-05-16 DIAGNOSIS — I15.0 RENOVASCULAR HYPERTENSION: Primary | ICD-10-CM

## 2018-05-16 DIAGNOSIS — F17.200 TOBACCO USE DISORDER: ICD-10-CM

## 2018-05-16 DIAGNOSIS — C67.9 MALIGNANT NEOPLASM OF URINARY BLADDER, UNSPECIFIED SITE (HCC): ICD-10-CM
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
## 2018-05-16 NOTE — MR AVS SNAPSHOT
[facility] [address] [facility] 
[phone] Patient: Lachelle Porter MRN: [id number] :1965 Visit Information Date & Time Provider Department Dept. Phone Encounter #  
 2018  4:00 PM Mohan Esquivel MD [facility] [phone] [id number] Follow-up Instructions Return in about 6 months (around 2018), or if symptoms worsen or fail to improve, for htn. Upcoming Health Maintenance Date Due Pneumococcal 19-64 Highest Risk (2 of 3 - PCV13) 2017 Influenza Age 5 to Adult 2018 FOBT Q 1 YEAR AGE 50-75 2018 DTaP/Tdap/Td series (2 - Td) 2027 Allergies as of 2018  Review Complete On: 2018 By: Jovany Aburto MD  
  
 Severity Noted Reaction Type Reactions Codeine  2014    Itching Current Immunizations  Never Reviewed No immunizations on file. Not reviewed this visit You Were Diagnosed With   
  
 Codes Comments Renovascular hypertension    -  Primary ICD-10-CM: I15.0 ICD-9-CM: 405.91 Tobacco use disorder     ICD-10-CM: F17.200 ICD-9-CM: 305.1 Vitals BP Pulse Temp Resp Height(growth percentile) Weight(growth percentile) 116/76 (BP 1 Location: Left arm, BP Patient Position: Sitting) 72 97.9 °F (36.6 °C) (Oral) 18 5' 3\" (1.6 m) 135 lb (61.2 kg) SpO2 BMI Smoking Status 96% 23.91 kg/m2 Current Every Day Smoker BMI and BSA Data Body Mass Index Body Surface Area  
 23.91 kg/m 2 1.65 m 2 Preferred Pharmacy Pharmacy Name Phone [facility] [address] [place] [address] AT [facility] [phone] Your Updated Medication List  
  
   
This list is accurate as of 18  4:28 PM.  Always use your most recent med list. amLODIPine 5 mg tablet Commonly known as:  Mari Paredes TAKE 1 TABLET BY MOUTH DAILY aspirin 81 mg chewable tablet Take 81 mg by mouth daily. naproxen 500 mg tablet Commonly known as:  NAPROSYN Take 1 Tab by mouth two (2) times daily as needed. Follow-up Instructions Return in about 6 months (around 11/16/2018), or if symptoms worsen or fail to improve, for htn. To-Do List   
 05/16/2018 Lab:  CBC WITH AUTOMATED DIFF   
  
 05/16/2018 Lab:  LIPID PANEL   
  
 05/16/2018 Lab:  METABOLIC PANEL, COMPREHENSIVE Introducing [facility]! [facility] introduces [employer] patient portal. Now you can access parts of your medical record, email your doctor's office, and request medication refills online. 1. In your internet browser, go to [email or web address] 2. Click on the First Time User? Click Here link in the Sign In box. You will see the New Member Sign Up page. 3. Enter your [employer] Access Code exactly as it appears below. You will not need to use this code after youve completed the sign-up process. If you do not sign up before the expiration date, you must request a new code. · [employer] Access Code: [email or web address] Expires: 8/14/2018  4:28 PM 
 
4. Enter the last four digits of your Social Security Number (xxxx) and Date of Birth (mm/dd/yyyy) as indicated and click Submit. You will be taken to the next sign-up page. 5. Create a [employer] ID. This will be your [employer] login ID and cannot be changed, so think of one that is secure and easy to remember. 6. Create a [employer] password. You can change your password at any time. 7. Enter your Password Reset Question and Answer. This can be used at a later time if you forget your password. 8. Enter your e-mail address. You will receive e-mail notification when new information is available in [address] 9. Click Sign Up. You can now view and download portions of your medical record.  
10. Click the Download Summary menu link to download a portable copy of your medical information. If you have questions, please visit the Frequently Asked Questions section of the [employer] website. Remember, [employer] is NOT to be used for urgent needs. For medical emergencies, dial 911. Now available from your iPhone and Android! Please provide this summary of care documentation to your next provider. Your primary care clinician is listed as Mohan Saravia. If you have any questions after today's visit, please call [phone].

## 2018-05-18 LAB
A-G RATIO,AGRAT: 2.5 RATIO (ref 1.1–2.6)
ABSOLUTE LYMPHOCYTE COUNT, 10803: 3.2 K/UL (ref 1–4.8)
ALBUMIN SERPL-MCNC: 4.5 G/DL (ref 3.5–5)
ALP SERPL-CCNC: 43 U/L (ref 25–115)
ALT SERPL-CCNC: 11 U/L (ref 5–40)
ANION GAP SERPL CALC-SCNC: 15 MMOL/L
AST SERPL W P-5'-P-CCNC: 15 U/L (ref 10–37)
BASOPHILS # BLD: 0.1 K/UL (ref 0–0.2)
BASOPHILS NFR BLD: 1 % (ref 0–2)
BILIRUB SERPL-MCNC: 0.2 MG/DL (ref 0.2–1.2)
BUN SERPL-MCNC: 18 MG/DL (ref 6–22)
CALCIUM SERPL-MCNC: 9.2 MG/DL (ref 8.4–10.4)
CHLORIDE SERPL-SCNC: 105 MMOL/L (ref 98–110)
CHOLEST SERPL-MCNC: 181 MG/DL (ref 110–200)
CO2 SERPL-SCNC: 24 MMOL/L (ref 20–32)
CREAT SERPL-MCNC: 0.8 MG/DL (ref 0.5–1.2)
EOSINOPHIL # BLD: 0.6 K/UL (ref 0–0.5)
EOSINOPHIL NFR BLD: 6 % (ref 0–6)
ERYTHROCYTE [DISTWIDTH] IN BLOOD BY AUTOMATED COUNT: 15.6 % (ref 10–15.5)
GFRAA, 66117: >60
GFRNA, 66118: >60
GLOBULIN,GLOB: 1.8 G/DL (ref 2–4)
GLUCOSE SERPL-MCNC: 90 MG/DL (ref 70–99)
GRANULOCYTES,GRANS: 53 % (ref 40–75)
HCT VFR BLD AUTO: 46.7 % (ref 39.3–51.6)
HDLC SERPL-MCNC: 3.2 MG/DL (ref 0–5)
HDLC SERPL-MCNC: 56 MG/DL (ref 40–59)
HGB BLD-MCNC: 14.8 G/DL (ref 13.1–17.2)
LDLC SERPL CALC-MCNC: 82 MG/DL (ref 50–99)
LYMPHOCYTES, LYMLT: 32 % (ref 20–45)
MCH RBC QN AUTO: 32 PG (ref 26–34)
MCHC RBC AUTO-ENTMCNC: 32 G/DL (ref 31–36)
MCV RBC AUTO: 102 FL (ref 80–95)
MONOCYTES # BLD: 0.9 K/UL (ref 0.1–1)
MONOCYTES NFR BLD: 9 % (ref 3–12)
NEUTROPHILS # BLD AUTO: 5.2 K/UL (ref 1.8–7.7)
PLATELET # BLD AUTO: 235 K/UL (ref 140–440)
PMV BLD AUTO: 11.3 FL (ref 9–13)
POTASSIUM SERPL-SCNC: 4.2 MMOL/L (ref 3.5–5.5)
PROT SERPL-MCNC: 6.3 G/DL (ref 6.4–8.3)
RBC # BLD AUTO: 4.59 M/UL (ref 3.8–5.8)
SODIUM SERPL-SCNC: 144 MMOL/L (ref 133–145)
TRIGL SERPL-MCNC: 211 MG/DL (ref 40–149)
VLDLC SERPL CALC-MCNC: 42 MG/DL (ref 8–30)
WBC # BLD AUTO: 9.9 K/UL (ref 4–11)

## 2018-05-21 NOTE — PROGRESS NOTES
Please let patient know his triglyceride level is 211. He can lower this by taking a fish oil at thousand milligrams omega-3 twice a day. Rest of his labs are reassuring.   Jose E Lam MD

## 2018-05-23 NOTE — PROGRESS NOTES
Informed patient of lab results at this time. Patient verbalized understanding at this time.  No other concerns noted at this time

## 2018-10-31 DIAGNOSIS — I15.0 RENOVASCULAR HYPERTENSION: ICD-10-CM

## 2018-10-31 RX ORDER — AMLODIPINE BESYLATE 5 MG/1
TABLET ORAL
Qty: 90 TAB | Refills: 0 | Status: SHIPPED | OUTPATIENT
Start: 2018-10-31 | End: 2019-02-19 | Stop reason: SDUPTHER

## 2018-11-19 ENCOUNTER — OFFICE VISIT (OUTPATIENT)
Dept: FAMILY MEDICINE CLINIC | Age: 53
End: 2018-11-19

## 2018-11-19 VITALS
DIASTOLIC BLOOD PRESSURE: 89 MMHG | HEART RATE: 72 BPM | WEIGHT: 135 LBS | RESPIRATION RATE: 18 BRPM | BODY MASS INDEX: 23.92 KG/M2 | SYSTOLIC BLOOD PRESSURE: 150 MMHG | OXYGEN SATURATION: 99 % | TEMPERATURE: 98.3 F | HEIGHT: 63 IN

## 2018-11-19 DIAGNOSIS — I15.0 RENOVASCULAR HYPERTENSION: Primary | ICD-10-CM

## 2018-11-19 DIAGNOSIS — S39.012A BACK STRAIN, INITIAL ENCOUNTER: ICD-10-CM

## 2018-11-19 DIAGNOSIS — M79.7 FIBROMYALGIA: ICD-10-CM

## 2018-11-19 DIAGNOSIS — F17.200 TOBACCO USE DISORDER: ICD-10-CM
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
## 2019-02-19 ENCOUNTER — HOSPITAL ENCOUNTER (OUTPATIENT)
Dept: LAB | Age: 54
Discharge: HOME | End: 2019-02-19
Payer: COMMERCIAL

## 2019-02-19 ENCOUNTER — OFFICE VISIT (OUTPATIENT)
Dept: FAMILY MEDICINE CLINIC | Age: 54
End: 2019-02-19

## 2019-02-19 VITALS
RESPIRATION RATE: 16 BRPM | TEMPERATURE: 97.1 F | DIASTOLIC BLOOD PRESSURE: 82 MMHG | HEART RATE: 78 BPM | SYSTOLIC BLOOD PRESSURE: 138 MMHG | HEIGHT: 63 IN | BODY MASS INDEX: 24.45 KG/M2 | WEIGHT: 138 LBS | OXYGEN SATURATION: 94 %

## 2019-02-19 DIAGNOSIS — I15.0 RENOVASCULAR HYPERTENSION: ICD-10-CM

## 2019-02-19 DIAGNOSIS — M25.551 PAIN OF RIGHT HIP JOINT: ICD-10-CM

## 2019-02-19 DIAGNOSIS — M54.5 CHRONIC MIDLINE LOW BACK PAIN, WITH SCIATICA PRESENCE UNSPECIFIED: Primary | ICD-10-CM

## 2019-02-19 DIAGNOSIS — I10 ESSENTIAL HYPERTENSION: ICD-10-CM

## 2019-02-19 DIAGNOSIS — C67.9 MALIGNANT NEOPLASM OF URINARY BLADDER, UNSPECIFIED SITE (HCC): ICD-10-CM

## 2019-02-19 DIAGNOSIS — Z01.89 ENCOUNTER FOR LABORATORY TEST: ICD-10-CM

## 2019-02-19 DIAGNOSIS — I63.40 CEREBRAL INFARCTION DUE TO EMBOLISM OF CEREBRAL ARTERY (HCC): ICD-10-CM

## 2019-02-19 DIAGNOSIS — G89.29 CHRONIC MIDLINE LOW BACK PAIN, WITH SCIATICA PRESENCE UNSPECIFIED: Primary | ICD-10-CM

## 2019-02-19 DIAGNOSIS — Z12.11 SCREEN FOR COLON CANCER: ICD-10-CM

## 2019-02-19 PROCEDURE — 85025 COMPLETE CBC W/AUTO DIFF WBC: CPT

## 2019-02-19 PROCEDURE — 80053 COMPREHEN METABOLIC PANEL: CPT

## 2019-02-19 PROCEDURE — 80061 LIPID PANEL: CPT

## 2019-02-19 RX ORDER — AMLODIPINE BESYLATE 5 MG/1
5 TABLET ORAL DAILY
Qty: 90 TAB | Refills: 1 | Status: SHIPPED | OUTPATIENT
Start: 2019-02-19 | End: 2019-06-12 | Stop reason: SDUPTHER

## 2019-02-19 RX ORDER — HYDROCODONE BITARTRATE AND ACETAMINOPHEN 5; 325 MG/1; MG/1
1 TABLET ORAL
Qty: 20 TAB | Refills: 0 | Status: SHIPPED | OUTPATIENT
Start: 2019-02-19 | End: 2020-01-09

## 2019-02-19 RX ORDER — IBUPROFEN 800 MG/1
TABLET ORAL
COMMUNITY
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
## 2019-02-20 LAB
ALBUMIN SERPL-MCNC: 4.2 G/DL (ref 3.4–5)
ALBUMIN/GLOB SERPL: 1.6 {RATIO} (ref 0.8–1.7)
ALP SERPL-CCNC: 53 U/L (ref 45–117)
ALT SERPL-CCNC: 24 U/L (ref 16–61)
ANION GAP SERPL CALC-SCNC: 7 MMOL/L (ref 3–18)
AST SERPL-CCNC: 12 U/L (ref 15–37)
BASOPHILS # BLD: 0 K/UL (ref 0–0.1)
BASOPHILS NFR BLD: 0 % (ref 0–2)
BILIRUB SERPL-MCNC: 0.2 MG/DL (ref 0.2–1)
BUN SERPL-MCNC: 23 MG/DL (ref 7–18)
BUN/CREAT SERPL: 26 (ref 12–20)
CALCIUM SERPL-MCNC: 9.4 MG/DL (ref 8.5–10.1)
CHLORIDE SERPL-SCNC: 111 MMOL/L (ref 100–108)
CHOLEST SERPL-MCNC: 182 MG/DL
CO2 SERPL-SCNC: 26 MMOL/L (ref 21–32)
CREAT SERPL-MCNC: 0.88 MG/DL (ref 0.6–1.3)
DIFFERENTIAL METHOD BLD: ABNORMAL
EOSINOPHIL # BLD: 0.5 K/UL (ref 0–0.4)
EOSINOPHIL NFR BLD: 5 % (ref 0–5)
ERYTHROCYTE [DISTWIDTH] IN BLOOD BY AUTOMATED COUNT: 13.9 % (ref 11.6–14.5)
GLOBULIN SER CALC-MCNC: 2.6 G/DL (ref 2–4)
GLUCOSE SERPL-MCNC: 76 MG/DL (ref 74–99)
HCT VFR BLD AUTO: 44.8 % (ref 36–48)
HDLC SERPL-MCNC: 74 MG/DL (ref 40–60)
HDLC SERPL: 2.5 {RATIO} (ref 0–5)
HGB BLD-MCNC: 15 G/DL (ref 13–16)
LDLC SERPL CALC-MCNC: 91 MG/DL (ref 0–100)
LIPID PROFILE,FLP: ABNORMAL
LYMPHOCYTES # BLD: 3.4 K/UL (ref 0.9–3.6)
LYMPHOCYTES NFR BLD: 34 % (ref 21–52)
MCH RBC QN AUTO: 33 PG (ref 24–34)
MCHC RBC AUTO-ENTMCNC: 33.5 G/DL (ref 31–37)
MCV RBC AUTO: 98.5 FL (ref 74–97)
MONOCYTES # BLD: 0.7 K/UL (ref 0.05–1.2)
MONOCYTES NFR BLD: 7 % (ref 3–10)
NEUTS SEG # BLD: 5.4 K/UL (ref 1.8–8)
NEUTS SEG NFR BLD: 54 % (ref 40–73)
PLATELET # BLD AUTO: 238 K/UL (ref 135–420)
PMV BLD AUTO: 11.9 FL (ref 9.2–11.8)
POTASSIUM SERPL-SCNC: 4.1 MMOL/L (ref 3.5–5.5)
PROT SERPL-MCNC: 6.8 G/DL (ref 6.4–8.2)
RBC # BLD AUTO: 4.55 M/UL (ref 4.7–5.5)
SODIUM SERPL-SCNC: 144 MMOL/L (ref 136–145)
TRIGL SERPL-MCNC: 85 MG/DL (ref ?–150)
VLDLC SERPL CALC-MCNC: 17 MG/DL
WBC # BLD AUTO: 10.1 K/UL (ref 4.6–13.2)

## 2019-02-21 ENCOUNTER — HOSPITAL ENCOUNTER (OUTPATIENT)
Dept: LAB | Age: 54
Discharge: HOME | End: 2019-02-21
Payer: COMMERCIAL

## 2019-02-21 DIAGNOSIS — Z12.11 SCREEN FOR COLON CANCER: ICD-10-CM

## 2019-02-21 PROCEDURE — 82274 ASSAY TEST FOR BLOOD FECAL: CPT

## 2019-02-27 ENCOUNTER — OFFICE VISIT (OUTPATIENT)
Dept: ORTHOPEDIC SURGERY | Age: 54
End: 2019-02-27

## 2019-02-27 VITALS
SYSTOLIC BLOOD PRESSURE: 129 MMHG | OXYGEN SATURATION: 94 % | RESPIRATION RATE: 16 BRPM | HEART RATE: 67 BPM | TEMPERATURE: 97.9 F | DIASTOLIC BLOOD PRESSURE: 79 MMHG | BODY MASS INDEX: 24.13 KG/M2 | HEIGHT: 63 IN | WEIGHT: 136.2 LBS

## 2019-02-27 DIAGNOSIS — M51.36 LUMBAR DEGENERATIVE DISC DISEASE: ICD-10-CM

## 2019-02-27 DIAGNOSIS — M16.11 ARTHRITIS OF RIGHT HIP: Primary | ICD-10-CM

## 2019-02-27 LAB — HEMOCCULT STL QL IA: NEGATIVE

## 2019-02-27 RX ORDER — BACLOFEN 10 MG/1
10 TABLET ORAL 3 TIMES DAILY
Qty: 40 TAB | Refills: 1 | Status: SHIPPED | OUTPATIENT
Start: 2019-02-27 | End: 2020-01-09

## 2019-02-27 RX ORDER — MELOXICAM 15 MG/1
15 TABLET ORAL
Qty: 30 TAB | Refills: 1 | Status: SHIPPED | OUTPATIENT
Start: 2019-02-27
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
## 2019-03-06 ENCOUNTER — TELEPHONE (OUTPATIENT)
Dept: FAMILY MEDICINE CLINIC | Age: 54
End: 2019-03-06

## 2019-03-11 ENCOUNTER — HOSPITAL ENCOUNTER (OUTPATIENT)
Age: 54
Discharge: HOME | End: 2019-03-11
Attending: ORTHOPAEDIC SURGERY
Payer: COMMERCIAL

## 2019-03-11 DIAGNOSIS — M16.11 ARTHRITIS OF RIGHT HIP: ICD-10-CM

## 2019-03-11 PROCEDURE — 73721 MRI JNT OF LWR EXTRE W/O DYE: CPT

## 2019-03-15 ENCOUNTER — HOSPITAL ENCOUNTER (OUTPATIENT)
Dept: GENERAL RADIOLOGY | Age: 54
Discharge: HOME | End: 2019-03-15
Attending: ORTHOPAEDIC SURGERY
Payer: COMMERCIAL

## 2019-03-15 DIAGNOSIS — M16.11 ARTHRITIS OF RIGHT HIP: ICD-10-CM

## 2019-03-15 PROCEDURE — 20610 DRAIN/INJ JOINT/BURSA W/O US: CPT

## 2019-03-15 PROCEDURE — 77002 NEEDLE LOCALIZATION BY XRAY: CPT

## 2019-03-15 PROCEDURE — 74011250636 HC RX REV CODE- 250/636: Performed by: ORTHOPAEDIC SURGERY

## 2019-03-15 PROCEDURE — 74011636320 HC RX REV CODE- 636/320: Performed by: ORTHOPAEDIC SURGERY

## 2019-03-15 RX ORDER — LIDOCAINE HYDROCHLORIDE 10 MG/ML
10 INJECTION, SOLUTION EPIDURAL; INFILTRATION; INTRACAUDAL; PERINEURAL
Status: COMPLETED | OUTPATIENT
Start: 2019-03-15 | End: 2019-03-15

## 2019-03-15 RX ORDER — SODIUM CHLORIDE 9 MG/ML
3 INJECTION INTRAMUSCULAR; INTRAVENOUS; SUBCUTANEOUS
Status: DISCONTINUED | OUTPATIENT
Start: 2019-03-15 | End: 2019-03-15

## 2019-03-15 RX ORDER — METHYLPREDNISOLONE ACETATE 40 MG/ML
40 INJECTION, SUSPENSION INTRA-ARTICULAR; INTRALESIONAL; INTRAMUSCULAR; SOFT TISSUE
Status: COMPLETED | OUTPATIENT
Start: 2019-03-15 | End: 2019-03-15

## 2019-03-15 RX ADMIN — IOTHALAMATE MEGLUMINE 50 ML: 600 INJECTION INTRAVASCULAR at 07:55

## 2019-03-15 RX ADMIN — METHYLPREDNISOLONE ACETATE 40 MG: 40 INJECTION, SUSPENSION INTRA-ARTICULAR; INTRALESIONAL; INTRAMUSCULAR; SOFT TISSUE at 07:58

## 2019-03-15 RX ADMIN — LIDOCAINE HYDROCHLORIDE 10 ML: 10 INJECTION, SOLUTION EPIDURAL; INFILTRATION; INTRACAUDAL; PERINEURAL at 07:52

## 2019-03-20 ENCOUNTER — OFFICE VISIT (OUTPATIENT)
Dept: ORTHOPEDIC SURGERY | Age: 54
End: 2019-03-20

## 2019-03-20 VITALS
OXYGEN SATURATION: 99 % | SYSTOLIC BLOOD PRESSURE: 145 MMHG | TEMPERATURE: 98.4 F | BODY MASS INDEX: 24.56 KG/M2 | RESPIRATION RATE: 16 BRPM | HEIGHT: 63 IN | WEIGHT: 138.6 LBS | DIASTOLIC BLOOD PRESSURE: 78 MMHG | HEART RATE: 82 BPM

## 2019-03-20 DIAGNOSIS — M54.16 LUMBAR NEURITIS: ICD-10-CM

## 2019-03-20 DIAGNOSIS — M16.11 ARTHRITIS OF RIGHT HIP: Primary | ICD-10-CM

## 2019-03-20 DIAGNOSIS — M47.817 LUMBOSACRAL SPONDYLOSIS WITHOUT MYELOPATHY: ICD-10-CM

## 2019-03-20 DIAGNOSIS — M51.36 DDD (DEGENERATIVE DISC DISEASE), LUMBAR: ICD-10-CM

## 2019-03-20 NOTE — PROGRESS NOTES
Chief Complaint   Patient presents with    Hip Pain     NP ref by Dr. Lani Dominguez for right hip pain    Back Pain     low back right side    Leg Pain     RLE     1. Have you been to the ER, urgent care clinic since your last visit? Hospitalized since your last visit? No    2. Have you seen or consulted any other health care providers outside of the [address] [place] [address] since your last visit? Include any pap smears or colon screening.  No
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
## 2019-03-29 ENCOUNTER — HOSPITAL ENCOUNTER (OUTPATIENT)
Age: 54
Discharge: HOME | End: 2019-03-29
Attending: PHYSICAL MEDICINE & REHABILITATION
Payer: COMMERCIAL

## 2019-03-29 DIAGNOSIS — M51.36 DDD (DEGENERATIVE DISC DISEASE), LUMBAR: ICD-10-CM

## 2019-03-29 DIAGNOSIS — M47.817 LUMBOSACRAL SPONDYLOSIS WITHOUT MYELOPATHY: ICD-10-CM

## 2019-03-29 DIAGNOSIS — M16.11 ARTHRITIS OF RIGHT HIP: ICD-10-CM

## 2019-03-29 DIAGNOSIS — M54.16 LUMBAR NEURITIS: ICD-10-CM

## 2019-03-29 PROCEDURE — 72148 MRI LUMBAR SPINE W/O DYE: CPT

## 2019-04-08 ENCOUNTER — OFFICE VISIT (OUTPATIENT)
Dept: ORTHOPEDIC SURGERY | Age: 54
End: 2019-04-08

## 2019-04-08 VITALS
BODY MASS INDEX: 24.63 KG/M2 | OXYGEN SATURATION: 96 % | WEIGHT: 139 LBS | RESPIRATION RATE: 24 BRPM | SYSTOLIC BLOOD PRESSURE: 133 MMHG | TEMPERATURE: 98.8 F | HEART RATE: 86 BPM | DIASTOLIC BLOOD PRESSURE: 79 MMHG | HEIGHT: 63 IN

## 2019-04-08 DIAGNOSIS — M54.16 LUMBAR NEURITIS: ICD-10-CM

## 2019-04-08 DIAGNOSIS — M51.36 DDD (DEGENERATIVE DISC DISEASE), LUMBAR: ICD-10-CM

## 2019-04-08 DIAGNOSIS — M47.817 LUMBOSACRAL SPONDYLOSIS WITHOUT MYELOPATHY: ICD-10-CM

## 2019-04-08 DIAGNOSIS — M51.26 HNP (HERNIATED NUCLEUS PULPOSUS), LUMBAR: ICD-10-CM

## 2019-04-08 DIAGNOSIS — M16.11 ARTHRITIS OF RIGHT HIP: Primary | ICD-10-CM

## 2019-04-08 DIAGNOSIS — M54.16 LUMBAR RADICULOPATHY: ICD-10-CM

## 2019-04-08 NOTE — LETTER
4/8/19 Patient: Kyung Peoples YOB: 1965 Date of Visit: 4/8/2019 Anila Poe MD 
[facility] [address] Care [address] [place] [address] VIA In Basket Dear Anila Poe MD, Thank you for referring Mr. Melvin Calderon to [address] [place] [address] for evaluation. My notes for this consultation are attached. If you have questions, please do not hesitate to call me. I look forward to following your patient along with you. Sincerely, Diaz Giles MD
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
## 2019-04-09 ENCOUNTER — OFFICE VISIT (OUTPATIENT)
Dept: ORTHOPEDIC SURGERY | Age: 54
End: 2019-04-09

## 2019-04-09 VITALS
TEMPERATURE: 98.1 F | SYSTOLIC BLOOD PRESSURE: 145 MMHG | OXYGEN SATURATION: 99 % | WEIGHT: 141 LBS | RESPIRATION RATE: 16 BRPM | DIASTOLIC BLOOD PRESSURE: 91 MMHG | BODY MASS INDEX: 24.98 KG/M2 | HEART RATE: 78 BPM | HEIGHT: 63 IN

## 2019-04-09 DIAGNOSIS — S73.191A TEAR OF RIGHT ACETABULAR LABRUM, INITIAL ENCOUNTER: ICD-10-CM

## 2019-04-09 DIAGNOSIS — M16.11 ARTHRITIS OF RIGHT HIP: Primary | ICD-10-CM

## 2019-04-09 NOTE — PROGRESS NOTES
Teena Jarquin 1965 Chief Complaint Patient presents with  
 Hip Pain  
  right hip f/u HISTORY OF PRESENT ILLNESS Teena Jarquin is a 53 y.o. male who presents today for reevaluation of right hip pain and to review MRI. Patient rates pain as 5/10 today. Since OV, patient had a cortisone injection which provided some relief, but he is still in pain. He reports a catching sensation in the knee. Pain has been present for 3 months. He states he will be walking and then the hip will pop and feel like it comes out of joint. He also has a problem with his back which radiates down the leg. He works as a . Patient denies any fever, chills, chest pain, shortness of breath or calf pain. The remainder of the review of systems is negative. There are no new illness or injuries to report since last seen in the office. There are no changes to medications, allergies, family or social history. PHYSICAL EXAM:  
Visit Vitals BP (!) 145/91 Pulse 78 Temp 98.1 °F (36.7 °C) (Oral) Resp 16 Ht 5' 3\" (1.6 m) Wt 141 lb (64 kg) SpO2 99% BMI 24.98 kg/m² The patient is a well-developed, well-nourished male   in no acute distress. The patient is alert and oriented times three. The patient is alert and oriented times three. Mood and affect are normal. 
LYMPHATIC: lymph nodes are not enlarged and are within normal limits SKIN: normal in color and non tender to palpation. There are no bruises or abrasions noted. NEUROLOGICAL: Motor sensory exam is within normal limits. Reflexes are equal bilaterally. There is normal sensation to pinprick and light touch MUSCULOSKELETAL: 
Examination Right hip Skin Intact Flexion ROM 90 Extension ROM 0 External Rotation ROM 30 Internal Rotation ROM 30 Abduction ROM 20 Adduction ROM 20  
FADDIR + ERIKA + Log roll test - Trochanteric tenderness -  
Wicho test -  
Neurovascular Intact IMAGING: MRI of right hip dated 3/11/19 was reviewed and read:  
IMPRESSION:  
1. Advanced right hip osteoarthrosis with high-grade chondral loss, labral tear, small joint effusion and synovitis. 2. Mild left hip osteoarthrosis with labral tears and paralabral cysts. 
-Loculated fluid signal lesion posterior to the left hip might be related to labral tears such as para labral cyst, ganglion cyst or dilated venous vasculature. 3. Mild iliopsoas bursitis bilaterally. 4. Sigmoid diverticulosis with colonic wall thickening. No acute diverticulitis. XR of right hip dated 2/19/19 was reviewed and read: IMPRESSION: 
1.  No evidence of acute fracture or dislocation. 2.   Grade 3 osteoarthritis of the right hip. IMPRESSION:   
  ICD-10-CM ICD-9-CM 1. Arthritis of right hip M16.11 716.95   
2. Tear of right acetabular labrum, initial encounter S73.191A 843.8 PLAN:  
1. The patient presents today with right hip pain due to MRI documented osteoarthritis and a labral tear. I do not think a hip arthroscope would be helpful at this time and I would like him to follow you with Dr. Romayne Huxley. Risk factors include: htn 2. No ultrasound exam indicated today 3. No cortisone injection indicated today 4. No Physical/Occupational Therapy indicated today 5. No diagnostic test indicated today: 6. No durable medical equipment indicated today 7. Yes referral indicated today DR. MCCOLLUM 8. No medications indicated today: 9. No Narcotic indicated today RTC prn 
 
Scribed by [facility] ([address]) as dictated by Thee Acuna MD 
 
I, Dr. Thee Acuna, confirm that all documentation is accurate.  
 
Thee Acuna M.D.  
[facility]

## 2019-04-10 ENCOUNTER — OFFICE VISIT (OUTPATIENT)
Dept: ORTHOPEDIC SURGERY | Age: 54
End: 2019-04-10

## 2019-04-10 VITALS
DIASTOLIC BLOOD PRESSURE: 94 MMHG | OXYGEN SATURATION: 97 % | HEART RATE: 74 BPM | RESPIRATION RATE: 13 BRPM | BODY MASS INDEX: 24.98 KG/M2 | TEMPERATURE: 97 F | SYSTOLIC BLOOD PRESSURE: 136 MMHG | WEIGHT: 141 LBS | HEIGHT: 63 IN

## 2019-04-10 DIAGNOSIS — M16.11 ARTHRITIS OF RIGHT HIP: ICD-10-CM

## 2019-04-10 DIAGNOSIS — M16.11 PRIMARY OSTEOARTHRITIS OF RIGHT HIP: Primary | ICD-10-CM

## 2019-04-10 DIAGNOSIS — M51.26 HNP (HERNIATED NUCLEUS PULPOSUS), LUMBAR: ICD-10-CM

## 2019-04-10 DIAGNOSIS — M51.36 DDD (DEGENERATIVE DISC DISEASE), LUMBAR: ICD-10-CM

## 2019-04-10 DIAGNOSIS — M70.71 ILIOPSOAS BURSITIS OF RIGHT HIP: ICD-10-CM

## 2019-04-10 DIAGNOSIS — M70.72 ILIOPSOAS BURSITIS OF LEFT HIP: ICD-10-CM

## 2019-04-10 DIAGNOSIS — M25.551 RIGHT HIP PAIN: ICD-10-CM

## 2019-04-10 DIAGNOSIS — M54.16 LUMBAR RADICULOPATHY: ICD-10-CM

## 2019-04-10 DIAGNOSIS — S73.191D TEAR OF RIGHT ACETABULAR LABRUM, SUBSEQUENT ENCOUNTER: ICD-10-CM

## 2019-04-10 DIAGNOSIS — S73.192D TEAR OF LEFT ACETABULAR LABRUM, SUBSEQUENT ENCOUNTER: ICD-10-CM

## 2019-04-10 RX ORDER — DICLOFENAC SODIUM 10 MG/G
4 GEL TOPICAL 4 TIMES DAILY
Qty: 5 EACH | Refills: 5 | Status: SHIPPED | OUTPATIENT
Start: 2019-04-10

## 2019-04-10 RX ORDER — BETAMETHASONE SODIUM PHOSPHATE AND BETAMETHASONE ACETATE 3; 3 MG/ML; MG/ML
6 INJECTION, SUSPENSION INTRA-ARTICULAR; INTRALESIONAL; INTRAMUSCULAR; SOFT TISSUE ONCE
Qty: 0.5 ML | Refills: 0
Start: 2019-04-10 | End: 2019-04-10
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
## 2019-04-10 NOTE — PROGRESS NOTES
1. Have you been to the ER, urgent care clinic since your last visit? Hospitalized since your last visit? No 
 
2. Have you seen or consulted any other health care providers outside of the [address] [place] [address] since your last visit? Include any pap smears or colon screening.  No

## 2019-04-11 ENCOUNTER — TELEPHONE (OUTPATIENT)
Dept: ORTHOPEDIC SURGERY | Facility: CLINIC | Age: 54
End: 2019-04-11

## 2019-04-11 NOTE — TELEPHONE ENCOUNTER
Please use Key: [id number] to initiate a prior authorization for Voltaren gel through Cover My Meds or contact the pharmacy to change the medication.

## 2019-04-23 NOTE — TELEPHONE ENCOUNTER
Blythe Gottron ([place]: [address] [facility])    This request has been approved. [id number]  Status:Approved  Review Type:Prior Auth  Coverage Start Date:03/24/2019  Coverage End Date:04/22/2020    Contacted the patient's pharmacy and advised them to submit a claim for this medication. Claim submitted and approved for generic co-pay. No further action required.

## 2019-06-12 ENCOUNTER — OFFICE VISIT (OUTPATIENT)
Dept: FAMILY MEDICINE CLINIC | Age: 54
End: 2019-06-12

## 2019-06-12 VITALS
SYSTOLIC BLOOD PRESSURE: 107 MMHG | WEIGHT: 140 LBS | HEART RATE: 76 BPM | RESPIRATION RATE: 16 BRPM | TEMPERATURE: 97.7 F | DIASTOLIC BLOOD PRESSURE: 70 MMHG | OXYGEN SATURATION: 95 % | HEIGHT: 63 IN | BODY MASS INDEX: 24.8 KG/M2

## 2019-06-12 DIAGNOSIS — G89.29 CHRONIC MIDLINE LOW BACK PAIN, WITH SCIATICA PRESENCE UNSPECIFIED: ICD-10-CM

## 2019-06-12 DIAGNOSIS — I15.0 RENOVASCULAR HYPERTENSION: Primary | ICD-10-CM

## 2019-06-12 DIAGNOSIS — M54.5 CHRONIC MIDLINE LOW BACK PAIN, WITH SCIATICA PRESENCE UNSPECIFIED: ICD-10-CM

## 2019-06-12 RX ORDER — AMLODIPINE BESYLATE 5 MG/1
5 TABLET ORAL DAILY
Qty: 90 TAB | Refills: 1 | Status: SHIPPED | OUTPATIENT
Start: 2019-06-12 | End: 2019-10-25 | Stop reason: SDUPTHER
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
## 2019-10-25 DIAGNOSIS — I15.0 RENOVASCULAR HYPERTENSION: ICD-10-CM

## 2019-10-25 NOTE — TELEPHONE ENCOUNTER
Requested Prescriptions     Pending Prescriptions Disp Refills    amLODIPine (NORVASC) 5 mg tablet 90 Tab 1     Sig: Take 1 Tab by mouth daily.    Patient is completely out of this medication

## 2019-10-29 RX ORDER — AMLODIPINE BESYLATE 5 MG/1
5 TABLET ORAL DAILY
Qty: 90 TAB | Refills: 1 | Status: SHIPPED | OUTPATIENT
Start: 2019-10-29 | End: 2020-01-09 | Stop reason: SDUPTHER

## 2020-01-09 ENCOUNTER — OFFICE VISIT (OUTPATIENT)
Dept: FAMILY MEDICINE CLINIC | Age: 55
End: 2020-01-09

## 2020-01-09 VITALS
TEMPERATURE: 97 F | SYSTOLIC BLOOD PRESSURE: 131 MMHG | HEIGHT: 63 IN | RESPIRATION RATE: 16 BRPM | WEIGHT: 141 LBS | BODY MASS INDEX: 24.98 KG/M2 | DIASTOLIC BLOOD PRESSURE: 86 MMHG | HEART RATE: 68 BPM | OXYGEN SATURATION: 98 %

## 2020-01-09 DIAGNOSIS — Z72.0 TOBACCO ABUSE DISORDER: ICD-10-CM

## 2020-01-09 DIAGNOSIS — I15.0 RENOVASCULAR HYPERTENSION: Primary | ICD-10-CM

## 2020-01-09 DIAGNOSIS — M25.551 PAIN OF RIGHT HIP JOINT: ICD-10-CM

## 2020-01-09 DIAGNOSIS — Z12.11 SCREEN FOR COLON CANCER: ICD-10-CM

## 2020-01-09 DIAGNOSIS — Z12.5 SCREENING FOR MALIGNANT NEOPLASM OF PROSTATE: ICD-10-CM

## 2020-01-09 RX ORDER — AMLODIPINE BESYLATE 5 MG/1
5 TABLET ORAL DAILY
Qty: 90 TAB | Refills: 3 | Status: SHIPPED | OUTPATIENT
Start: 2020-01-09 | End: 2020-03-30 | Stop reason: SDUPTHER

## 2020-01-09 RX ORDER — BUPROPION HYDROCHLORIDE 150 MG/1
150 TABLET, EXTENDED RELEASE ORAL 2 TIMES DAILY
Qty: 60 TAB | Refills: 2 | Status: SHIPPED | OUTPATIENT
Start: 2020-01-09
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
## 2020-01-10 ENCOUNTER — HOSPITAL ENCOUNTER (OUTPATIENT)
Dept: LAB | Age: 55
Discharge: HOME | End: 2020-01-10
Payer: COMMERCIAL

## 2020-01-10 ENCOUNTER — LAB ONLY (OUTPATIENT)
Dept: FAMILY MEDICINE CLINIC | Age: 55
End: 2020-01-10

## 2020-01-10 DIAGNOSIS — Z12.5 SCREENING FOR MALIGNANT NEOPLASM OF PROSTATE: ICD-10-CM

## 2020-01-10 PROCEDURE — 82274 ASSAY TEST FOR BLOOD FECAL: CPT

## 2020-01-13 ENCOUNTER — HOSPITAL ENCOUNTER (OUTPATIENT)
Dept: LAB | Age: 55
Discharge: HOME | End: 2020-01-13
Payer: COMMERCIAL

## 2020-01-13 ENCOUNTER — LAB ONLY (OUTPATIENT)
Dept: FAMILY MEDICINE CLINIC | Age: 55
End: 2020-01-13

## 2020-01-13 DIAGNOSIS — I15.0 RENOVASCULAR HYPERTENSION: ICD-10-CM

## 2020-01-13 DIAGNOSIS — Z12.11 SCREEN FOR COLON CANCER: ICD-10-CM

## 2020-01-13 DIAGNOSIS — Z01.89 ENCOUNTER FOR LABORATORY TEST: Primary | ICD-10-CM

## 2020-01-13 LAB
ALBUMIN SERPL-MCNC: 3.8 G/DL (ref 3.4–5)
ALBUMIN/GLOB SERPL: 1.3 {RATIO} (ref 0.8–1.7)
ALP SERPL-CCNC: 52 U/L (ref 45–117)
ALT SERPL-CCNC: 34 U/L (ref 16–61)
ANION GAP SERPL CALC-SCNC: 6 MMOL/L (ref 3–18)
AST SERPL-CCNC: 19 U/L (ref 10–38)
BASOPHILS # BLD: 0.1 K/UL (ref 0–0.1)
BASOPHILS NFR BLD: 0 % (ref 0–2)
BILIRUB SERPL-MCNC: 0.3 MG/DL (ref 0.2–1)
BUN SERPL-MCNC: 25 MG/DL (ref 7–18)
BUN/CREAT SERPL: 24 (ref 12–20)
CALCIUM SERPL-MCNC: 8.9 MG/DL (ref 8.5–10.1)
CHLORIDE SERPL-SCNC: 113 MMOL/L (ref 100–111)
CHOLEST SERPL-MCNC: 199 MG/DL
CO2 SERPL-SCNC: 25 MMOL/L (ref 21–32)
CREAT SERPL-MCNC: 1.06 MG/DL (ref 0.6–1.3)
DIFFERENTIAL METHOD BLD: ABNORMAL
EOSINOPHIL # BLD: 0.8 K/UL (ref 0–0.4)
EOSINOPHIL NFR BLD: 6 % (ref 0–5)
ERYTHROCYTE [DISTWIDTH] IN BLOOD BY AUTOMATED COUNT: 14.4 % (ref 11.6–14.5)
GLOBULIN SER CALC-MCNC: 2.9 G/DL (ref 2–4)
GLUCOSE SERPL-MCNC: 87 MG/DL (ref 74–99)
HCT VFR BLD AUTO: 45.4 % (ref 36–48)
HDLC SERPL-MCNC: 58 MG/DL (ref 40–60)
HDLC SERPL: 3.4 {RATIO} (ref 0–5)
HEMOCCULT STL QL IA: NEGATIVE
HGB BLD-MCNC: 15.4 G/DL (ref 13–16)
LDLC SERPL CALC-MCNC: 110.4 MG/DL (ref 0–100)
LIPID PROFILE,FLP: ABNORMAL
LYMPHOCYTES # BLD: 3.5 K/UL (ref 0.9–3.6)
LYMPHOCYTES NFR BLD: 29 % (ref 21–52)
MCH RBC QN AUTO: 32.6 PG (ref 24–34)
MCHC RBC AUTO-ENTMCNC: 33.9 G/DL (ref 31–37)
MCV RBC AUTO: 96.2 FL (ref 74–97)
MONOCYTES # BLD: 1 K/UL (ref 0.05–1.2)
MONOCYTES NFR BLD: 9 % (ref 3–10)
NEUTS SEG # BLD: 6.7 K/UL (ref 1.8–8)
NEUTS SEG NFR BLD: 56 % (ref 40–73)
PLATELET # BLD AUTO: 212 K/UL (ref 135–420)
PMV BLD AUTO: 11.9 FL (ref 9.2–11.8)
POTASSIUM SERPL-SCNC: 4.4 MMOL/L (ref 3.5–5.5)
PROT SERPL-MCNC: 6.7 G/DL (ref 6.4–8.2)
PSA SERPL-MCNC: 3.6 NG/ML (ref 0–4)
RBC # BLD AUTO: 4.72 M/UL (ref 4.7–5.5)
SODIUM SERPL-SCNC: 144 MMOL/L (ref 136–145)
TRIGL SERPL-MCNC: 153 MG/DL (ref ?–150)
VLDLC SERPL CALC-MCNC: 30.6 MG/DL
WBC # BLD AUTO: 12 K/UL (ref 4.6–13.2)

## 2020-01-13 PROCEDURE — 84153 ASSAY OF PSA TOTAL: CPT

## 2020-01-13 PROCEDURE — 80053 COMPREHEN METABOLIC PANEL: CPT

## 2020-01-13 PROCEDURE — 80061 LIPID PANEL: CPT

## 2020-01-13 PROCEDURE — 85025 COMPLETE CBC W/AUTO DIFF WBC: CPT

## 2020-01-13 NOTE — PROGRESS NOTES
Patient in today for lab visit at this time. Patient tolerated well at this time. Venipunture to patient left forearm at this time. No other concerns noted

## 2020-01-15 NOTE — PROGRESS NOTES
Called patient at this time regarding lab results. No answer noted at this time. LVM for patient to return phone call

## 2020-01-15 NOTE — PROGRESS NOTES
Please let patient know his LDL cholesterol is slightly elevated. He should exercise and take a diet low in polysaturated fats. If he is willing I will send in a prescription for cholesterol-lowering medication. Recheck in 3 to 6 months.   America Gaming MD

## 2020-01-24 ENCOUNTER — OFFICE VISIT (OUTPATIENT)
Dept: ORTHOPEDIC SURGERY | Facility: CLINIC | Age: 55
End: 2020-01-24

## 2020-01-24 VITALS
HEART RATE: 78 BPM | DIASTOLIC BLOOD PRESSURE: 90 MMHG | RESPIRATION RATE: 16 BRPM | OXYGEN SATURATION: 97 % | WEIGHT: 142 LBS | SYSTOLIC BLOOD PRESSURE: 134 MMHG | TEMPERATURE: 97.7 F | BODY MASS INDEX: 25.16 KG/M2 | HEIGHT: 63 IN

## 2020-01-24 DIAGNOSIS — S73.191D TEAR OF RIGHT ACETABULAR LABRUM, SUBSEQUENT ENCOUNTER: ICD-10-CM

## 2020-01-24 DIAGNOSIS — M16.11 PRIMARY OSTEOARTHRITIS OF RIGHT HIP: Primary | ICD-10-CM

## 2020-01-24 DIAGNOSIS — M25.551 HIP PAIN, RIGHT: ICD-10-CM

## 2020-01-24 NOTE — PROGRESS NOTES
1. Have you been to the ER, urgent care clinic since your last visit? Hospitalized since your last visit? No    2. Have you seen or consulted any other health care providers outside of the [address] [place] [address] since your last visit? Include any pap smears or colon screening.  No
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
## 2020-01-27 ENCOUNTER — TELEPHONE (OUTPATIENT)
Dept: FAMILY MEDICINE CLINIC | Age: 55
End: 2020-01-27

## 2020-01-27 NOTE — TELEPHONE ENCOUNTER
Patient called stating he was prescribed buPropion to help quit smoking. He's been on it for about 2 weeks. He is experiencing a rash. He would like to discuss this medication with the nurse.

## 2020-01-28 NOTE — TELEPHONE ENCOUNTER
Spoke with patient at this time and patient states he has an reaction to Wellbutrin at this time. Patient report having swelling in hand and joints along with rashes and itching. Advised patient to stop medication at this time. Patient states he stopped the medication yesterday. Patient denies any diff breathing or respiratory symptoms at this time. Advised patient to buy hydrocortisone for the itching and redness. Also informed patient is symptoms get worse to seek the Er. Patient verbalized understanding. Offered patient an appointment for this week. Patient declined at this time. Will send message to provider to make him aware at this time

## 2020-02-03 NOTE — PROGRESS NOTES
After obtaining identifiers patient was advised of lab results and option of lowering cholesterol medications. Patient denies medications at this time will wait to see results at next visit.   Patient verbalized understanding

## 2020-03-30 DIAGNOSIS — I15.0 RENOVASCULAR HYPERTENSION: ICD-10-CM

## 2020-03-31 RX ORDER — AMLODIPINE BESYLATE 5 MG/1
5 TABLET ORAL DAILY
Qty: 90 TAB | Refills: 3 | Status: SHIPPED | OUTPATIENT
Start: 2020-03-31

## 2022-04-20 ENCOUNTER — TELEPHONE (OUTPATIENT)
Dept: FAMILY MEDICINE CLINIC | Age: 57
End: 2022-04-20

## 2022-04-20 NOTE — TELEPHONE ENCOUNTER
Spoke with patient and he stated the right side of lower back is killing him. Patient stated it feel like an electrical shock going down his back. Patient was informed to go to er or urgent care to be evaluated. Patient stated that he will!

## 2025-05-15 ENCOUNTER — OFFICE VISIT (OUTPATIENT)
Age: 60
End: 2025-05-15
Payer: COMMERCIAL

## 2025-05-15 VITALS
HEART RATE: 74 BPM | TEMPERATURE: 97.5 F | BODY MASS INDEX: 20.56 KG/M2 | OXYGEN SATURATION: 97 % | WEIGHT: 131 LBS | HEIGHT: 67 IN | DIASTOLIC BLOOD PRESSURE: 94 MMHG | SYSTOLIC BLOOD PRESSURE: 163 MMHG

## 2025-05-15 DIAGNOSIS — R10.31 RIGHT GROIN PAIN: Primary | ICD-10-CM

## 2025-05-15 DIAGNOSIS — K42.9 UMBILICAL HERNIA WITHOUT OBSTRUCTION AND WITHOUT GANGRENE: ICD-10-CM

## 2025-05-15 DIAGNOSIS — S39.012A BACK STRAIN, INITIAL ENCOUNTER: ICD-10-CM

## 2025-05-15 DIAGNOSIS — M25.551 RIGHT HIP PAIN: ICD-10-CM

## 2025-05-15 PROCEDURE — 99204 OFFICE O/P NEW MOD 45 MIN: CPT | Performed by: SURGERY

## 2025-05-15 NOTE — PROGRESS NOTES
Jer Kwok is a 60 y.o. male (: 1965) presenting to address:    Chief Complaint   Patient presents with    New Patient     Right inguinal pain and burning x 1 week.  [facility]       Medication list and allergies have been reviewed with Jer Kwok and updated as of today's date.     I have gone over all Medical, Surgical and Social History with Jer Kwok and updated/added the information accordingly.
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
## 2025-05-20 ENCOUNTER — HOSPITAL ENCOUNTER (OUTPATIENT)
Facility: HOSPITAL | Age: 60
Discharge: HOME | End: 2025-05-23
Attending: SURGERY
Payer: COMMERCIAL

## 2025-05-20 DIAGNOSIS — R10.31 RIGHT GROIN PAIN: ICD-10-CM

## 2025-05-20 PROCEDURE — 76882 US LMTD JT/FCL EVL NVASC XTR: CPT

## 2025-05-20 PROCEDURE — 76870 US EXAM SCROTUM: CPT

## 2025-05-29 ENCOUNTER — OFFICE VISIT (OUTPATIENT)
Age: 60
End: 2025-05-29
Payer: COMMERCIAL

## 2025-05-29 ENCOUNTER — PREP FOR PROCEDURE (OUTPATIENT)
Age: 60
End: 2025-05-29

## 2025-05-29 VITALS
WEIGHT: 126 LBS | SYSTOLIC BLOOD PRESSURE: 156 MMHG | DIASTOLIC BLOOD PRESSURE: 80 MMHG | HEART RATE: 68 BPM | OXYGEN SATURATION: 98 % | TEMPERATURE: 97.5 F | BODY MASS INDEX: 19.78 KG/M2 | HEIGHT: 67 IN

## 2025-05-29 DIAGNOSIS — K40.90 RIGHT INGUINAL HERNIA: ICD-10-CM

## 2025-05-29 DIAGNOSIS — K40.90 RIGHT INGUINAL HERNIA: Primary | ICD-10-CM

## 2025-05-29 PROCEDURE — 99214 OFFICE O/P EST MOD 30 MIN: CPT | Performed by: SURGERY

## 2025-05-29 NOTE — PROGRESS NOTES
Jer Kwok is a 60 y.o. male (: 1965) presenting to address:    Chief Complaint   Patient presents with    Follow-up     Discuss US for right groin pain       Medication list and allergies have been reviewed with Jer Kwok and updated as of today's date.     I have gone over all Medical, Surgical and Social History with Jer Kwok and updated/added the information accordingly.       1. Have you been to the ER, Urgent Care or Hospitalized since your last visit? No          2. Have you followed up with your PCP or any other Physicians since your procedure/ last office visit?   No    
round, reactive to light   Throat & Neck: normal, no erythema or exudates noted. , and no palpable masses   Lungs:   clear to auscultation bilaterally   Heart:  Regular rate and rhythm   Abdomen:   flat, soft, nontender, nondistended, no masses or organomegaly.  There is significant right groin tenderness with possible hernia consistent with the ultrasound.   Extremities: extremities normal, atraumatic, no cyanosis or edema   Skin: Normal.       Imaging and Lab Review:     CBC:   Lab Results   Component Value Date/Time    WBC 12.0 01/13/2020 12:06 PM    RBC 4.72 01/13/2020 12:06 PM    HGB 15.4 01/13/2020 12:06 PM    HCT 45.4 01/13/2020 12:06 PM     01/13/2020 12:06 PM     BMP:   Lab Results   Component Value Date/Time     01/13/2020 12:06 PM    K 4.4 01/13/2020 12:06 PM     01/13/2020 12:06 PM    CO2 25 01/13/2020 12:06 PM    BUN 25 01/13/2020 12:06 PM     CMP:  Lab Results   Component Value Date/Time     01/13/2020 12:06 PM    K 4.4 01/13/2020 12:06 PM     01/13/2020 12:06 PM    CO2 25 01/13/2020 12:06 PM    BUN 25 01/13/2020 12:06 PM    GLOB 2.9 01/13/2020 12:06 PM       No results found for this or any previous visit (from the past 24 hours).    images and reports reviewed    Assessment:   Jer Kwok is a 60 y.o. male who is very well-known to me and is here for follow-up on his right groin pain and to discuss the ultrasound results.  The patient has been having groin pain in addition to severe hip pain and back pain for years.  The ultrasound showed a right inguinal hernia.  The patient is still complaining of pain in the right groin in addition to pain in the right hip.     I had a long discussion with the patient and explained to him that his pain in the hip will not be fixed by fixing the hernia but because he has pain in the right groin area it makes sense to fix the hernia though I gave him the option to follow-up with the orthopedic first.  The was very clear that he

## 2025-06-10 ENCOUNTER — TELEPHONE (OUTPATIENT)
Age: 60
End: 2025-06-10

## 2025-06-10 NOTE — TELEPHONE ENCOUNTER
Spoke to Mr. Kwok regarding FMLA forms.  Informed Dr. Jha is out of the office this week and I'll obtain his signature on Monday, June 16, 2025.  Additionally inquired where to return forms because no return information is noted on the document.  I'll need fax or email information.  Mr. Kwok states he'll check with Lisa at his office and send me an email.  I gave Mr. Kwok my email address to forward HR contact information.

## 2025-06-11 ENCOUNTER — TELEPHONE (OUTPATIENT)
Age: 60
End: 2025-06-11

## 2025-06-11 RX ORDER — NITROGLYCERIN 0.4 MG/1
0.4 TABLET SUBLINGUAL PRN
COMMUNITY
Start: 2025-02-16

## 2025-06-11 RX ORDER — METOPROLOL SUCCINATE 25 MG/1
25 TABLET, EXTENDED RELEASE ORAL DAILY
COMMUNITY
Start: 2025-02-07

## 2025-06-11 NOTE — TELEPHONE ENCOUNTER
Left voicemail message for Mr. Kwok to contact our office regarding [facility] cardiology clearance/instructions may hold aspirin x 5 days prior to scheduled surgery on Wednesday, June 18, 2025 with Dr. Jha.

## 2025-06-11 NOTE — PERIOP NOTE
Instructions for your surgery at [facility]  [address] [place] [address]      Today's Date:  6/11/2025      Patient's Name:  Jer Kwok           Surgery Date:  6/18/2025              Please enter the main entrance of the hospital and check-in at the front security desk located in the lobby. They will direct you to the area to report for your surgery.     Do NOT eat or drink anything, including candy, gum, or ice chips after midnight prior to your surgery, unless you have specific instructions from your surgeon or anesthesia provider to do so.  Brush your teeth before coming to the hospital. You may swish with water, but do not swallow.  No smoking/Vaping/E-Cigarettes 24 hours prior to the day of surgery.  No alcohol 24 hours prior to the day of surgery.  No recreational drugs for one week prior to the day of surgery.  Bring Photo ID, Insurance information, and Co-pay if required on day of surgery.  Bring in pertinent legal documents, such as, Medical Power of , DNR, Advance Directive, etc.  Leave all valuables, including money/purse, weapons at home.  Remove all jewelry, including ALL body piercings, nail polish, acrylic nails, and makeup (including mascara); no lotions, powders, deodorant, or perfume/cologne/after shave on the skin.  Follow instruction for Hibiclens washes and CHG wipes from surgeon's office.   Glasses and dentures may be worn to the hospital. They must be removed prior to surgery. Please bring case/container for glasses or dentures.   Contact lenses should not be worn on day of surgery.   Call your doctor's office if symptoms of a cold or illness develop within 24-48 hours prior to your surgery.  Call your doctor's office if you have any questions concerning insurance or co-pays.  15. AN ADULT (relative or friend 18 years or older) MUST DRIVE YOU HOME AFTER YOUR SURGERY.  16. Please make arrangements for a responsible adult (18 years or

## 2025-06-15 ENCOUNTER — ANESTHESIA EVENT (OUTPATIENT)
Age: 60
End: 2025-06-15
Payer: COMMERCIAL

## 2025-06-18 ENCOUNTER — ANESTHESIA (OUTPATIENT)
Age: 60
End: 2025-06-18
Payer: COMMERCIAL

## 2025-06-18 ENCOUNTER — HOSPITAL ENCOUNTER (OUTPATIENT)
Age: 60
Setting detail: OUTPATIENT SURGERY
Discharge: HOME | End: 2025-06-18
Attending: SURGERY | Admitting: SURGERY
Payer: COMMERCIAL

## 2025-06-18 VITALS
HEART RATE: 76 BPM | OXYGEN SATURATION: 96 % | HEIGHT: 67 IN | RESPIRATION RATE: 16 BRPM | SYSTOLIC BLOOD PRESSURE: 169 MMHG | DIASTOLIC BLOOD PRESSURE: 68 MMHG | WEIGHT: 122 LBS | BODY MASS INDEX: 19.15 KG/M2 | TEMPERATURE: 97.5 F

## 2025-06-18 DIAGNOSIS — Z98.890 S/P HERNIA REPAIR: Primary | ICD-10-CM

## 2025-06-18 DIAGNOSIS — Z87.19 S/P HERNIA REPAIR: Primary | ICD-10-CM

## 2025-06-18 PROCEDURE — S2900 ROBOTIC SURGICAL SYSTEM: HCPCS | Performed by: SURGERY

## 2025-06-18 PROCEDURE — 6370000000 HC RX 637 (ALT 250 FOR IP): Performed by: ANESTHESIOLOGY

## 2025-06-18 PROCEDURE — 7100000000 HC PACU RECOVERY - FIRST 15 MIN: Performed by: SURGERY

## 2025-06-18 PROCEDURE — 2500000003 HC RX 250 WO HCPCS: Performed by: ANESTHESIOLOGY

## 2025-06-18 PROCEDURE — 2580000003 HC RX 258: Performed by: ANESTHESIOLOGY

## 2025-06-18 PROCEDURE — 7100000011 HC PHASE II RECOVERY - ADDTL 15 MIN: Performed by: SURGERY

## 2025-06-18 PROCEDURE — 2580000003 HC RX 258: Performed by: SURGERY

## 2025-06-18 PROCEDURE — 2500000003 HC RX 250 WO HCPCS: Performed by: SURGERY

## 2025-06-18 PROCEDURE — 3700000001 HC ADD 15 MINUTES (ANESTHESIA): Performed by: SURGERY

## 2025-06-18 PROCEDURE — 6360000002 HC RX W HCPCS: Performed by: ANESTHESIOLOGY

## 2025-06-18 PROCEDURE — 3600000019 HC SURGERY ROBOT ADDTL 15MIN: Performed by: SURGERY

## 2025-06-18 PROCEDURE — 2709999900 HC NON-CHARGEABLE SUPPLY: Performed by: SURGERY

## 2025-06-18 PROCEDURE — 6360000002 HC RX W HCPCS: Performed by: SURGERY

## 2025-06-18 PROCEDURE — 7100000001 HC PACU RECOVERY - ADDTL 15 MIN: Performed by: SURGERY

## 2025-06-18 PROCEDURE — 3600000009 HC SURGERY ROBOT BASE: Performed by: SURGERY

## 2025-06-18 PROCEDURE — 2720000010 HC SURG SUPPLY STERILE: Performed by: SURGERY

## 2025-06-18 PROCEDURE — 3700000000 HC ANESTHESIA ATTENDED CARE: Performed by: SURGERY

## 2025-06-18 PROCEDURE — 7100000010 HC PHASE II RECOVERY - FIRST 15 MIN: Performed by: SURGERY

## 2025-06-18 PROCEDURE — C1781 MESH (IMPLANTABLE): HCPCS | Performed by: SURGERY

## 2025-06-18 DEVICE — MESH CS RIGHT LARGE 10CM X 16CM: Type: IMPLANTABLE DEVICE | Site: INGUINAL | Status: FUNCTIONAL

## 2025-06-18 RX ORDER — SODIUM CHLORIDE 0.9 % (FLUSH) 0.9 %
5-40 SYRINGE (ML) INJECTION EVERY 12 HOURS SCHEDULED
Status: DISCONTINUED | OUTPATIENT
Start: 2025-06-18 | End: 2025-06-18 | Stop reason: HOSPADM

## 2025-06-18 RX ORDER — ONDANSETRON 2 MG/ML
INJECTION INTRAMUSCULAR; INTRAVENOUS
Status: DISCONTINUED | OUTPATIENT
Start: 2025-06-18 | End: 2025-06-18 | Stop reason: SDUPTHER

## 2025-06-18 RX ORDER — NALOXONE HYDROCHLORIDE 0.4 MG/ML
INJECTION, SOLUTION INTRAMUSCULAR; INTRAVENOUS; SUBCUTANEOUS PRN
Status: DISCONTINUED | OUTPATIENT
Start: 2025-06-18 | End: 2025-06-18 | Stop reason: HOSPADM

## 2025-06-18 RX ORDER — OXYCODONE HYDROCHLORIDE 5 MG/1
5 TABLET ORAL PRN
Status: COMPLETED | OUTPATIENT
Start: 2025-06-18 | End: 2025-06-18

## 2025-06-18 RX ORDER — ROCURONIUM BROMIDE 10 MG/ML
INJECTION, SOLUTION INTRAVENOUS
Status: DISCONTINUED | OUTPATIENT
Start: 2025-06-18 | End: 2025-06-18 | Stop reason: SDUPTHER

## 2025-06-18 RX ORDER — IPRATROPIUM BROMIDE AND ALBUTEROL SULFATE 2.5; .5 MG/3ML; MG/3ML
1 SOLUTION RESPIRATORY (INHALATION)
Status: DISCONTINUED | OUTPATIENT
Start: 2025-06-18 | End: 2025-06-18 | Stop reason: HOSPADM

## 2025-06-18 RX ORDER — SODIUM CHLORIDE 0.9 % (FLUSH) 0.9 %
5-40 SYRINGE (ML) INJECTION PRN
Status: DISCONTINUED | OUTPATIENT
Start: 2025-06-18 | End: 2025-06-18 | Stop reason: HOSPADM

## 2025-06-18 RX ORDER — LABETALOL HYDROCHLORIDE 5 MG/ML
INJECTION, SOLUTION INTRAVENOUS
Status: DISCONTINUED | OUTPATIENT
Start: 2025-06-18 | End: 2025-06-18 | Stop reason: SDUPTHER

## 2025-06-18 RX ORDER — OXYCODONE AND ACETAMINOPHEN 5; 325 MG/1; MG/1
1 TABLET ORAL EVERY 6 HOURS PRN
Qty: 12 TABLET | Refills: 0 | Status: SHIPPED | OUTPATIENT
Start: 2025-06-18 | End: 2025-06-21

## 2025-06-18 RX ORDER — CEFAZOLIN SODIUM 1 G/3ML
INJECTION, POWDER, FOR SOLUTION INTRAMUSCULAR; INTRAVENOUS
Status: DISCONTINUED | OUTPATIENT
Start: 2025-06-18 | End: 2025-06-18 | Stop reason: SDUPTHER

## 2025-06-18 RX ORDER — KETOROLAC TROMETHAMINE 15 MG/ML
INJECTION, SOLUTION INTRAMUSCULAR; INTRAVENOUS
Status: DISCONTINUED | OUTPATIENT
Start: 2025-06-18 | End: 2025-06-18 | Stop reason: SDUPTHER

## 2025-06-18 RX ORDER — LABETALOL HYDROCHLORIDE 5 MG/ML
5 INJECTION, SOLUTION INTRAVENOUS EVERY 10 MIN PRN
Status: DISCONTINUED | OUTPATIENT
Start: 2025-06-18 | End: 2025-06-18 | Stop reason: HOSPADM

## 2025-06-18 RX ORDER — PROPOFOL 10 MG/ML
INJECTION, EMULSION INTRAVENOUS
Status: DISCONTINUED | OUTPATIENT
Start: 2025-06-18 | End: 2025-06-18 | Stop reason: SDUPTHER

## 2025-06-18 RX ORDER — ONDANSETRON 2 MG/ML
4 INJECTION INTRAMUSCULAR; INTRAVENOUS
Status: DISCONTINUED | OUTPATIENT
Start: 2025-06-18 | End: 2025-06-18 | Stop reason: HOSPADM

## 2025-06-18 RX ORDER — METOPROLOL TARTRATE 1 MG/ML
INJECTION, SOLUTION INTRAVENOUS
Status: DISCONTINUED | OUTPATIENT
Start: 2025-06-18 | End: 2025-06-18 | Stop reason: SDUPTHER

## 2025-06-18 RX ORDER — FENTANYL CITRATE 50 UG/ML
50 INJECTION, SOLUTION INTRAMUSCULAR; INTRAVENOUS EVERY 5 MIN PRN
Status: COMPLETED | OUTPATIENT
Start: 2025-06-18 | End: 2025-06-18

## 2025-06-18 RX ORDER — SODIUM CHLORIDE, SODIUM LACTATE, POTASSIUM CHLORIDE, CALCIUM CHLORIDE 600; 310; 30; 20 MG/100ML; MG/100ML; MG/100ML; MG/100ML
INJECTION, SOLUTION INTRAVENOUS
Status: DISCONTINUED | OUTPATIENT
Start: 2025-06-18 | End: 2025-06-18 | Stop reason: SDUPTHER

## 2025-06-18 RX ORDER — FENTANYL CITRATE 50 UG/ML
INJECTION, SOLUTION INTRAMUSCULAR; INTRAVENOUS
Status: DISCONTINUED | OUTPATIENT
Start: 2025-06-18 | End: 2025-06-18 | Stop reason: SDUPTHER

## 2025-06-18 RX ORDER — DEXAMETHASONE SODIUM PHOSPHATE 4 MG/ML
INJECTION, SOLUTION INTRA-ARTICULAR; INTRALESIONAL; INTRAMUSCULAR; INTRAVENOUS; SOFT TISSUE
Status: DISCONTINUED | OUTPATIENT
Start: 2025-06-18 | End: 2025-06-18 | Stop reason: SDUPTHER

## 2025-06-18 RX ORDER — GLYCOPYRROLATE 0.2 MG/ML
INJECTION INTRAMUSCULAR; INTRAVENOUS
Status: DISCONTINUED | OUTPATIENT
Start: 2025-06-18 | End: 2025-06-18 | Stop reason: SDUPTHER

## 2025-06-18 RX ORDER — SODIUM CHLORIDE 9 MG/ML
INJECTION, SOLUTION INTRAVENOUS PRN
Status: DISCONTINUED | OUTPATIENT
Start: 2025-06-18 | End: 2025-06-18 | Stop reason: HOSPADM

## 2025-06-18 RX ORDER — LIDOCAINE HYDROCHLORIDE 40 MG/ML
SOLUTION TOPICAL
Status: DISCONTINUED | OUTPATIENT
Start: 2025-06-18 | End: 2025-06-18 | Stop reason: SDUPTHER

## 2025-06-18 RX ORDER — OXYCODONE HYDROCHLORIDE 10 MG/1
10 TABLET ORAL PRN
Status: COMPLETED | OUTPATIENT
Start: 2025-06-18 | End: 2025-06-18

## 2025-06-18 RX ORDER — LIDOCAINE HYDROCHLORIDE 20 MG/ML
INJECTION, SOLUTION EPIDURAL; INFILTRATION; INTRACAUDAL; PERINEURAL
Status: DISCONTINUED | OUTPATIENT
Start: 2025-06-18 | End: 2025-06-18 | Stop reason: SDUPTHER

## 2025-06-18 RX ORDER — SODIUM CHLORIDE, SODIUM LACTATE, POTASSIUM CHLORIDE, CALCIUM CHLORIDE 600; 310; 30; 20 MG/100ML; MG/100ML; MG/100ML; MG/100ML
INJECTION, SOLUTION INTRAVENOUS CONTINUOUS
Status: DISCONTINUED | OUTPATIENT
Start: 2025-06-18 | End: 2025-06-18 | Stop reason: HOSPADM

## 2025-06-18 RX ADMIN — DEXAMETHASONE SODIUM PHOSPHATE 8 MG: 4 INJECTION, SOLUTION INTRAMUSCULAR; INTRAVENOUS at 07:28

## 2025-06-18 RX ADMIN — METOPROLOL TARTRATE 5 MG: 5 INJECTION INTRAVENOUS at 07:29

## 2025-06-18 RX ADMIN — SODIUM CHLORIDE, SODIUM LACTATE, POTASSIUM CHLORIDE, AND CALCIUM CHLORIDE: 600; 310; 30; 20 INJECTION, SOLUTION INTRAVENOUS at 07:20

## 2025-06-18 RX ADMIN — ONDANSETRON 4 MG: 2 INJECTION INTRAMUSCULAR; INTRAVENOUS at 07:28

## 2025-06-18 RX ADMIN — LIDOCAINE HYDROCHLORIDE 4 ML: 40 SOLUTION TOPICAL at 07:32

## 2025-06-18 RX ADMIN — KETOROLAC TROMETHAMINE 15 MG: 15 INJECTION, SOLUTION INTRAMUSCULAR; INTRAVENOUS at 07:28

## 2025-06-18 RX ADMIN — ROCURONIUM BROMIDE 50 MG: 10 SOLUTION INTRAVENOUS at 07:29

## 2025-06-18 RX ADMIN — FENTANYL CITRATE 25 MCG: 50 INJECTION INTRAMUSCULAR; INTRAVENOUS at 07:44

## 2025-06-18 RX ADMIN — SODIUM CHLORIDE, SODIUM LACTATE, POTASSIUM CHLORIDE, AND CALCIUM CHLORIDE 1000 ML: .6; .31; .03; .02 INJECTION, SOLUTION INTRAVENOUS at 07:08

## 2025-06-18 RX ADMIN — LABETALOL HYDROCHLORIDE 10 MG: 5 INJECTION, SOLUTION INTRAVENOUS at 08:04

## 2025-06-18 RX ADMIN — LABETALOL HYDROCHLORIDE 5 MG: 5 INJECTION, SOLUTION INTRAVENOUS at 08:43

## 2025-06-18 RX ADMIN — HYDROMORPHONE HYDROCHLORIDE 0.25 MG: 1 INJECTION, SOLUTION INTRAMUSCULAR; INTRAVENOUS; SUBCUTANEOUS at 08:38

## 2025-06-18 RX ADMIN — PROPOFOL 200 MG: 10 INJECTION, EMULSION INTRAVENOUS at 07:27

## 2025-06-18 RX ADMIN — LIDOCAINE HYDROCHLORIDE 100 MG: 20 INJECTION, SOLUTION EPIDURAL; INFILTRATION; INTRACAUDAL; PERINEURAL at 07:27

## 2025-06-18 RX ADMIN — FENTANYL CITRATE 50 MCG: 50 INJECTION INTRAMUSCULAR; INTRAVENOUS at 08:24

## 2025-06-18 RX ADMIN — HYDROMORPHONE HYDROCHLORIDE 0.25 MG: 1 INJECTION, SOLUTION INTRAMUSCULAR; INTRAVENOUS; SUBCUTANEOUS at 08:46

## 2025-06-18 RX ADMIN — SUGAMMADEX 400 MG: 100 INJECTION, SOLUTION INTRAVENOUS at 07:57

## 2025-06-18 RX ADMIN — GLYCOPYRROLATE 0.2 MG: 0.2 INJECTION, SOLUTION INTRAMUSCULAR; INTRAVENOUS at 07:20

## 2025-06-18 RX ADMIN — FENTANYL CITRATE 50 MCG: 50 INJECTION INTRAMUSCULAR; INTRAVENOUS at 08:18

## 2025-06-18 RX ADMIN — CEFAZOLIN 2 G: 1 INJECTION, POWDER, FOR SOLUTION INTRAMUSCULAR; INTRAVENOUS at 07:34

## 2025-06-18 RX ADMIN — OXYCODONE HYDROCHLORIDE 5 MG: 5 TABLET ORAL at 08:56

## 2025-06-18 RX ADMIN — FENTANYL CITRATE 50 MCG: 50 INJECTION INTRAMUSCULAR; INTRAVENOUS at 07:20

## 2025-06-18 RX ADMIN — FENTANYL CITRATE 25 MCG: 50 INJECTION INTRAMUSCULAR; INTRAVENOUS at 08:00

## 2025-06-18 ASSESSMENT — PAIN DESCRIPTION - PAIN TYPE
TYPE: SURGICAL PAIN

## 2025-06-18 ASSESSMENT — PAIN SCALES - GENERAL
PAINLEVEL_OUTOF10: 4
PAINLEVEL_OUTOF10: 0
PAINLEVEL_OUTOF10: 6
PAINLEVEL_OUTOF10: 4
PAINLEVEL_OUTOF10: 7
PAINLEVEL_OUTOF10: 3
PAINLEVEL_OUTOF10: 3
PAINLEVEL_OUTOF10: 7
PAINLEVEL_OUTOF10: 6

## 2025-06-18 ASSESSMENT — PAIN DESCRIPTION - ONSET
ONSET: ON-GOING
ONSET: SUDDEN
ONSET: ON-GOING

## 2025-06-18 ASSESSMENT — PAIN DESCRIPTION - LOCATION
LOCATION: ABDOMEN;INCISION

## 2025-06-18 ASSESSMENT — PAIN DESCRIPTION - ORIENTATION
ORIENTATION: RIGHT
ORIENTATION: ANTERIOR

## 2025-06-18 ASSESSMENT — PAIN DESCRIPTION - DESCRIPTORS
DESCRIPTORS: ACHING;DISCOMFORT

## 2025-06-18 ASSESSMENT — PAIN - FUNCTIONAL ASSESSMENT
PAIN_FUNCTIONAL_ASSESSMENT: ACTIVITIES ARE NOT PREVENTED
PAIN_FUNCTIONAL_ASSESSMENT: 0-10
PAIN_FUNCTIONAL_ASSESSMENT: ACTIVITIES ARE NOT PREVENTED

## 2025-06-18 ASSESSMENT — PAIN DESCRIPTION - FREQUENCY
FREQUENCY: CONTINUOUS

## 2025-06-18 NOTE — OP NOTE
Operative Note      Patient: Jer Kwok  YOB: 1965  MRN: [id number]    Date of Procedure: 6/18/2025    Pre-Op Diagnosis Codes:      * Right inguinal hernia [K40.90]    Post-Op Diagnosis: Same       Procedure(s):  ROBOTIC RIGHT INGUINAL HERNIA REPAIR WITH PLACEMENT OF MESH    Surgeon(s):  Alise Jha MD    Assistant:   Surgical Assistant: Marie Card    Anesthesia: General    Estimated Blood Loss (mL): Minimal    Complications: None    Specimens:   * No specimens in log *    Implants:  Implant Name Type Inv. Item Serial No.  Lot No. LRB No. Used Action   MESH CS RIGHT LARGE 10CM X 16CM - [id number]  MESH CS RIGHT LARGE 10CM X 16CM  BARD DAVOL-WD [id number] Right 1 Implanted         Drains: * No LDAs found *    Findings:  Infection Present At Time Of Surgery (PATOS) (choose all levels that have infection present):  No infection present    Detailed Description of Procedure:   The patient was brought to the operating room anesthesia was induced and scrubbing and draping of the abdomen was in initial manner and a timeout was performed.  Skin incision in the supraumbilical area was performed and Veress needle was inserted and saline drop test was performed and abdomen was insufflated.  An 8 mm port was inserted and abdomen was explored.  There was no injury from the Veress needle or port placement and under the regularization 2 other 8 mm ports were placed on the right and left side of the abdominal wall and tap block was performed bilaterally and the patient was placed in Trendelenburg position and the robot was docked.  There was evidence of a right indirect inguinal hernia as well as a small direct inguinal hernia and at this point the peritoneum was opened and the preperitoneal space was dissected and the hernia sac was reduced and the Romel ligament was identified.  The inferior epigastric vessels and cord structures identified and protected.  A large right-sided Bard 3D mid

## 2025-06-18 NOTE — DISCHARGE INSTRUCTIONS
Discharge Instructions Following Surgery    Patient: Jer Kwok MRN: [id number]  SSN: [id number]    YOB: 1965  Age: 60 y.o.  Sex: male      Activity  As tolerated, walking encourage, stairs are okay.  Avoid strenuous activities - no lifting anything heavier than 15 pounds till seen in the clinic.  You may shower at home after 24 hours.  For the first 24 hours: do not Drive, Drink alcoholic beverages, or make important decisions.  Be aware of dizziness following anesthesia and while taking pain medication.    Diet and Medications  Regular diet after nausea from the anesthetic has passed.  Take pain medication as directed by your doctor.  Call your doctor if pain is NOT relieved by medication.    Wound and Dressing Care  There is glue on the wounds. No need for any dressing care.   Apply ice packs to the area of the surgery for the first 1 to 2 days  Apply warm compresses after 2 days for pain relieve if needed    Call your doctor if  Excessive bleeding that does not stop after holding pressure over the area.  Temperature of 101 degrees F or above.  Redness,excessive swelling or bruising, and/or green or yellow, smelly discharge from incision.  If nausea and vomiting continues.    Follow-Up    Call the office of Dr. Alise Jha at [phone] to make your follow-up appointment.    Dr. Jha's cell phone number is [phone]. Please call me if you have any concerns or questions.

## 2025-06-18 NOTE — PERIOP NOTE
Received PACU report from Ashley SAXEAN. Pt transferred to phase 2 room 7. Caregiver at bedside this time. Pt has been instructed the need to void prior to discharge.

## 2025-06-18 NOTE — PERIOP NOTE
Dr. Doyle aware of patient's blood pressure of 163/74. Per MD patient may proceed to phase 2 with no further interventions. Patient educated to take home blood pressure medication as prescribed upon discharge.

## 2025-06-18 NOTE — ANESTHESIA PRE PROCEDURE
Department of Anesthesiology  Preprocedure Note       Name:  Jer Kwok   Age:  60 y.o.  :  1965                                          MRN:  [id number]         Date:  2025      Surgeon: Surgeon(s):  Alise Jha MD    Procedure: Procedure(s):  ROBOTIC RIGHT INGUINAL HERNIA REPAIR WITH PLACEMENT OF MESH    Medications prior to admission:   Prior to Admission medications    Medication Sig Start Date End Date Taking? Authorizing Provider   oxyCODONE-acetaminophen (PERCOCET) 5-325 MG per tablet Take 1 tablet by mouth every 6 hours as needed for Pain for up to 3 days. Intended supply: 3 days. Take lowest dose possible to manage pain Max Daily Amount: 4 tablets 25 Yes Alise Jha MD   metoprolol succinate (TOPROL XL) 25 MG extended release tablet Take 1 tablet by mouth daily 25  Yes Provider, MD Celeste   nitroGLYCERIN (NITROSTAT) 0.4 MG SL tablet Place 1 tablet under the tongue as needed 25  Yes Provider, MD Celeste   ibuprofen (ADVIL;MOTRIN) 800 MG tablet Take by mouth   Yes Automatic Reconciliation, Ar   aspirin 81 MG chewable tablet Take 1 tablet by mouth daily    Automatic Reconciliation, Ar       Current medications:    Current Facility-Administered Medications   Medication Dose Route Frequency Provider Last Rate Last Admin   • ROPivacaine (NAROPIN) 40 mg, dexAMETHasone (DECADRON) 5 mg, dexmedeTOMIDine (PRECEDEX) 25 mcg 20.75 mL   Injection Once Alise Jha MD       • ceFAZolin (ANCEF) 2,000 mg in sterile water 20 mL IV syringe  2,000 mg IntraVENous Once Alise Jha MD       • lactated ringers infusion   IntraVENous Continuous Alise Jha  mL/hr at 25 0708 1,000 mL at 25 0708       Allergies:    Allergies   Allergen Reactions   • Codeine Itching and Other (See Comments)       Problem List:    Patient Active Problem List   Diagnosis Code   • Adenopathy, hilar R59.0   • Tobacco use disorder F17.200   • Malignant

## 2025-06-18 NOTE — PROGRESS NOTES
Update History & Physical    The patient's History and Physical was reviewed with the patient and I examined the patient. There was no change. The surgical site was confirmed by the patient and me.       Plan: The risks, benefits, expected outcome, and alternative to the recommended procedure have been discussed with the patient. Patient understands and wants to proceed with the procedure.  We will proceed with robotic right inguinal hernia repair with placement of mesh.    Electronically signed by Alise Jha MD on 6/18/2025 at 5:47 AM

## 2025-06-18 NOTE — PERIOP NOTE
Patient armband removed and shredded.  Patient belonging and discharge instructions in hand.  Pt wheeled out in wheelchair by PCT.    55

## 2025-06-18 NOTE — ANESTHESIA POSTPROCEDURE EVALUATION
Department of Anesthesiology  Postprocedure Note    Patient: Jer Kwok  MRN: [id number]  YOB: 1965  Date of evaluation: 6/18/2025    Procedure Summary       Date: 06/18/25 Room / Location: [facility] MAIN 01 / [facility] MAIN OR    Anesthesia Start: 0720 Anesthesia Stop: 0816    Procedure: ROBOTIC RIGHT INGUINAL HERNIA REPAIR WITH PLACEMENT OF MESH (Right: Abdomen) Diagnosis:       Right inguinal hernia      (Right inguinal hernia [K40.90])    Surgeons: Alise Jha MD Responsible Provider: Kunal Doyle III, MD    Anesthesia Type: general ASA Status: 3            Anesthesia Type: No value filed.    Asher Phase I:      Asher Phase II:      Anesthesia Post Evaluation    Patient location during evaluation: PACU  Patient participation: complete - patient participated  Level of consciousness: awake and alert  Pain score: 0  Airway patency: patent  Nausea & Vomiting: no nausea  Cardiovascular status: hemodynamically stable  Respiratory status: acceptable  Hydration status: euvolemic  Multimodal analgesia pain management approach  Pain management: adequate    No notable events documented.

## 2025-06-23 ENCOUNTER — TELEPHONE (OUTPATIENT)
Age: 60
End: 2025-06-23

## 2025-06-23 NOTE — TELEPHONE ENCOUNTER
Left voicemail message to inform of post op appointment scheduled on Thursday, June 26, 2025 at 9:30am at  office with Dr. Jha.

## 2025-06-23 NOTE — TELEPHONE ENCOUNTER
Pt called and wanted to know if he could get a refill of medication, he stated that he is still having some right sided irritation.

## 2025-06-26 ENCOUNTER — OFFICE VISIT (OUTPATIENT)
Age: 60
End: 2025-06-26

## 2025-06-26 VITALS
HEART RATE: 71 BPM | WEIGHT: 128 LBS | HEIGHT: 67 IN | OXYGEN SATURATION: 97 % | DIASTOLIC BLOOD PRESSURE: 80 MMHG | TEMPERATURE: 97.5 F | SYSTOLIC BLOOD PRESSURE: 151 MMHG | BODY MASS INDEX: 20.09 KG/M2

## 2025-06-26 DIAGNOSIS — K40.90 RIGHT INGUINAL HERNIA: Primary | ICD-10-CM

## 2025-06-26 DIAGNOSIS — S39.012A BACK STRAIN, INITIAL ENCOUNTER: ICD-10-CM

## 2025-06-26 DIAGNOSIS — M25.551 RIGHT HIP PAIN: ICD-10-CM

## 2025-06-26 DIAGNOSIS — R10.31 RIGHT GROIN PAIN: ICD-10-CM

## 2025-06-26 PROCEDURE — 99024 POSTOP FOLLOW-UP VISIT: CPT | Performed by: SURGERY
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
## 2025-06-30 ENCOUNTER — OFFICE VISIT (OUTPATIENT)
Age: 60
End: 2025-06-30
Payer: COMMERCIAL

## 2025-06-30 VITALS
OXYGEN SATURATION: 100 % | DIASTOLIC BLOOD PRESSURE: 76 MMHG | HEART RATE: 63 BPM | HEIGHT: 67 IN | SYSTOLIC BLOOD PRESSURE: 153 MMHG | WEIGHT: 129 LBS | BODY MASS INDEX: 20.25 KG/M2

## 2025-06-30 DIAGNOSIS — R10.31 RIGHT GROIN PAIN: ICD-10-CM

## 2025-06-30 DIAGNOSIS — M25.551 RIGHT HIP PAIN: ICD-10-CM

## 2025-06-30 DIAGNOSIS — K40.90 RIGHT INGUINAL HERNIA: Primary | ICD-10-CM

## 2025-06-30 PROCEDURE — 99214 OFFICE O/P EST MOD 30 MIN: CPT | Performed by: SURGERY
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
## 2025-07-14 ENCOUNTER — OFFICE VISIT (OUTPATIENT)
Age: 60
End: 2025-07-14
Payer: COMMERCIAL

## 2025-07-14 VITALS
OXYGEN SATURATION: 99 % | SYSTOLIC BLOOD PRESSURE: 154 MMHG | HEART RATE: 74 BPM | WEIGHT: 127 LBS | TEMPERATURE: 98 F | BODY MASS INDEX: 19.93 KG/M2 | DIASTOLIC BLOOD PRESSURE: 85 MMHG | HEIGHT: 67 IN

## 2025-07-14 DIAGNOSIS — R10.31 RIGHT GROIN PAIN: Primary | ICD-10-CM

## 2025-07-14 DIAGNOSIS — M25.551 RIGHT HIP PAIN: ICD-10-CM

## 2025-07-14 DIAGNOSIS — Z87.19 S/P HERNIA REPAIR: ICD-10-CM

## 2025-07-14 DIAGNOSIS — Z98.890 S/P HERNIA REPAIR: ICD-10-CM

## 2025-07-14 PROCEDURE — 99214 OFFICE O/P EST MOD 30 MIN: CPT | Performed by: SURGERY
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
## 2025-07-14 NOTE — PROGRESS NOTES
Jer Kwok is a 60 y.o. male (: 1965) presenting to address:    Chief Complaint   Patient presents with    Post-Op Check     Repair of right inguinal hernia/observation       Medication list and allergies have been reviewed with Jer Kwok and updated as of today's date.     I have gone over all Medical, Surgical and Social History with Jer Kwok and updated/added the information accordingly.       1. Have you been to the ER, Urgent Care or Hospitalized since your last visit? No          2. Have you followed up with your PCP or any other Physicians since your procedure/ last office visit?   No

## 2025-07-31 ENCOUNTER — OFFICE VISIT (OUTPATIENT)
Age: 60
End: 2025-07-31
Payer: COMMERCIAL

## 2025-07-31 VITALS
SYSTOLIC BLOOD PRESSURE: 185 MMHG | HEART RATE: 75 BPM | OXYGEN SATURATION: 100 % | DIASTOLIC BLOOD PRESSURE: 85 MMHG | HEIGHT: 67 IN | WEIGHT: 128 LBS | BODY MASS INDEX: 20.09 KG/M2

## 2025-07-31 DIAGNOSIS — S39.012A BACK STRAIN, INITIAL ENCOUNTER: ICD-10-CM

## 2025-07-31 DIAGNOSIS — R10.31 RIGHT GROIN PAIN: ICD-10-CM

## 2025-07-31 DIAGNOSIS — M25.551 RIGHT HIP PAIN: Primary | ICD-10-CM

## 2025-07-31 PROCEDURE — 99214 OFFICE O/P EST MOD 30 MIN: CPT | Performed by: SURGERY

## 2025-07-31 NOTE — PROGRESS NOTES
Jer Kwok is a 60 y.o. male (: 1965) presenting to address:    Chief Complaint   Patient presents with    Follow-up     Discomfort right lower abd.  S/P Repair right inguinal hernia 25       Medication list and allergies have been reviewed with Jer Kwok and updated as of today's date.     I have gone over all Medical, Surgical and Social History with Jer Kwok and updated/added the information accordingly.       1. Have you been to the ER, Urgent Care or Hospitalized since your last visit? No          2. Have you followed up with your PCP or any other Physicians since your procedure/ last office visit?   No
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
## (undated) DEVICE — GLOVE ORANGE PI 7 1/2   MSG9075

## (undated) DEVICE — APPLICATOR MEDICATED 26 CC SOLUTION HI LT ORNG CHLORAPREP

## (undated) DEVICE — ARM DRAPE

## (undated) DEVICE — SYRINGE MED 30ML STD CLR PLAS LUERLOCK TIP N CTRL DISP

## (undated) DEVICE — SUTURE MONOCRYL SZ 4-0 L27IN ABSRB UD L24MM PS-1 3/8 CIR PRIM Y935H

## (undated) DEVICE — SOLUTION IRRIG 1000ML 0.9% SOD CHL USP POUR PLAS BTL

## (undated) DEVICE — BLADE,STAINLESS-STEEL,11,STRL,DISPOSABLE: Brand: MEDLINE

## (undated) DEVICE — BLADELESS OBTURATOR: Brand: WECK VISTA

## (undated) DEVICE — Device

## (undated) DEVICE — COLUMN DRAPE

## (undated) DEVICE — SEAL

## (undated) DEVICE — LIGHT HANDLE: Brand: DEVON

## (undated) DEVICE — TUBE SET WITH SMOKE EVACUATION

## (undated) DEVICE — NEEDLE HYPO 25GA L1.5IN BVL ORIENTED ECLIPSE

## (undated) DEVICE — TIP COVER ACCESSORY

## (undated) DEVICE — INSUFFLATION NEEDLE TO ESTABLISH PNEUMOPERITONEUM.: Brand: INSUFFLATION NEEDLE

## (undated) DEVICE — KIT OR TURNOVER

## (undated) DEVICE — SUTURE VICRYL SZ 2-0 L27IN ABSRB UD L26MM SH 1/2 CIR J417H

## (undated) DEVICE — SYRINGE MED 10ML LUERLOCK TIP W/O SFTY DISP

## (undated) DEVICE — ELECTRODE PT RET AD L9FT HI MOIST COND ADH HYDRGEL CORDED

## (undated) DEVICE — NEPTUNE E-SEP SMOKE EVACUATION PENCIL, COATED, 70MM BLADE, PUSH BUTTON SWITCH: Brand: NEPTUNE E-SEP

## (undated) DEVICE — SUTURE MONOCRYL SZ 4-0 L18IN ABSRB UD L19MM PS-2 3/8 CIR PRIM Y496G

## (undated) DEVICE — NEEDLE SYRINGE 18GA L1.5IN RED PLAS HUB S STL BLNT FILL W/O

## (undated) DEVICE — DRAPE TOWEL: Brand: CONVERTORS

## (undated) DEVICE — ELECTRO LUBE IS A SINGLE PATIENT USE DEVICE THAT IS INTENDED TO BE USED ON ELECTROSURGICAL ELECTRODES TO REDUCE STICKING.: Brand: KEY SURGICAL ELECTRO LUBE

## (undated) DEVICE — LIQUIBAND RAPID ADHESIVE 36/CS 0.8ML: Brand: MEDLINE